# Patient Record
Sex: FEMALE | Race: AMERICAN INDIAN OR ALASKA NATIVE
[De-identification: names, ages, dates, MRNs, and addresses within clinical notes are randomized per-mention and may not be internally consistent; named-entity substitution may affect disease eponyms.]

---

## 2018-07-22 ENCOUNTER — HOSPITAL ENCOUNTER (EMERGENCY)
Dept: HOSPITAL 74 - JERFT | Age: 73
Discharge: HOME | End: 2018-07-22
Payer: COMMERCIAL

## 2018-07-22 VITALS — DIASTOLIC BLOOD PRESSURE: 88 MMHG | HEART RATE: 80 BPM | TEMPERATURE: 98.3 F | SYSTOLIC BLOOD PRESSURE: 160 MMHG

## 2018-07-22 VITALS — BODY MASS INDEX: 30.9 KG/M2

## 2018-07-22 DIAGNOSIS — Z79.84: ICD-10-CM

## 2018-07-22 DIAGNOSIS — M79.662: ICD-10-CM

## 2018-07-22 DIAGNOSIS — E11.9: ICD-10-CM

## 2018-07-22 DIAGNOSIS — M79.1: ICD-10-CM

## 2018-07-22 DIAGNOSIS — E78.00: ICD-10-CM

## 2018-07-22 DIAGNOSIS — J44.9: ICD-10-CM

## 2018-07-22 DIAGNOSIS — Z87.440: ICD-10-CM

## 2018-07-22 DIAGNOSIS — I10: ICD-10-CM

## 2018-07-22 DIAGNOSIS — D50.9: ICD-10-CM

## 2018-07-22 DIAGNOSIS — M79.661: Primary | ICD-10-CM

## 2018-07-22 NOTE — PDOC
History of Present Illness





- General


Chief Complaint: Pain


Stated Complaint: FATIGUE


Time Seen by Provider: 07/22/18 21:13


History Source: Patient


Exam Limitations: Clinical Condition





- History of Present Illness


Initial Comments: 





07/22/18 21:36


Patient with history of COPD present for evaluation of the lateral calf pain 

after starting Levaquin antibiotics with prednisone. Patient reported mild pain 

to cuff area with ambulation and presents for evaluation as she was told was 

possible  side effect of Levaquin. Denies any Achilles tenderness now denies 

any other symptoms now. Patient was being treated for bronchitis by PCP with 

Levaquin and prednisone and report her symptoms as improved with no cough now





Past History





- Past Medical History


Allergies/Adverse Reactions: 


 Allergies











Allergy/AdvReac Type Severity Reaction Status Date / Time


 


amoxicillin Allergy Severe diarrhea/severe Verified 07/22/18 20:56





   headache/  





   dizziness/freezing  











Home Medications: 


Ambulatory Orders





Atorvastatin Ca [Lipitor] 40 mg PO HS 06/28/13 


Cholecalciferol (Vitamin D3) [Vitamin D3] 1,000 unit PO DAILY 12/04/15 


Cyanocobalamin [Vitamin B12 -] 1,000 mcg PO DAILY 12/04/15 


Omega-3 Fatty Acids [Fish Oil] 300 mg PO DAILY 12/04/15 


Aspirin [ASA -] 81 mg PO DAILY 11/14/16 


Metoprolol Succinate [Toprol XL -] 12.5 mg PO DAILY 11/14/16 


metFORMIN HCL [Metformin HCl] 500 mg PO DAILY 11/14/16 


Hydrocodone/Acetaminophen [Hydrocodon-Acetaminoph 7.5-325] 1 each PO Q6H #40 

tablet MDD 4 11/15/16 








Anemia: Yes (TAKING IRON)


Asthma: No


Cancer: No


Cardiac Disorders: No


CVA: No


COPD: No


CHF: No


Dementia: No


Diabetes: Yes (NIDDM)


GI Disorders: No


 Disorders: Yes (FREQUENT UTI'S,KIDNEY STONES)


HTN: Yes


Hypercholesterolemia: Yes


Liver Disease: No


Seizures: No


Thyroid Disease: No





- Surgical History


Abdominal Surgery: No


Appendectomy: No


Cardiac Surgery: No


Cholecystectomy: No


Lung Surgery: No


Neurologic Surgery: No


Orthopedic Surgery: No





- Suicide/Smoking/Psychosocial Hx


Smoking Status: No


Smoking History: Former smoker


Have you smoked in the past 12 months: No


Number of Cigarettes Smoked Daily: 0


If you are a former smoker, when did you quit?: 2012


Information on smoking cessation initiated: No


Hx Alcohol Use: Yes


Drug/Substance Use Hx: No


Substance Use Type: Alcohol


Hx Substance Use Treatment: No





**Review of Systems





- Review of Systems


Able to Perform ROS?: Yes


Is the patient limited English proficient: No


Constitutional: No: Chills, Diaphoresis, Fever, Loss of Appetite, Malaise, 

Night Sweats, Weakness, Weight Stable, Unintentional Wgt. Loss, Unexplained wgt 

Loss, Other


HEENTM: No: Eye Pain, Blurred Vision, Tearing, Recent change in vision, Double 

Vision, Cataracts, Ear Pain, Ocular Prothesis, Ear Discharge, Nose Pain, Nose 

Congestion, Tinnitus, Nose Bleeding, Hearing Loss, Throat Pain, Throat Swelling

, Mouth Pain, Dental Problems, Difficulty Swallowing, Mouth Swelling, Other


Respiratory: No: Cough, Orthopnea, Shortness of Breath, SOB with Exertion, SOB 

at Rest, Stridor, Wheezing, Productive cough, Hemoptysis, Other


Cardiac (ROS): No: Chest Pain, Edema, Irregular Heart Rate, Lightheadedness, 

Palpitations, Syncope, Chest Tightness, Other


ABD/GI: No: Abdominal Distended, Abd. Pain w/ defecation, Blood Streaked Bowels

, Constipated, Diarrhea, Difficulty Swallowing, Nausea, Poor Appetite, Poor 

Fluid Intake, Rectal Bleeding, Vomiting, Indigestion, Abdominal cramping, Tarry 

Stools, Other


Musculoskeletal: Yes: Muscle Pain (b/l calf pain with ambulation)


All Other Systems: Reviewed and Negative





*Physical Exam





- Vital Signs


 Last Vital Signs











Temp Pulse Resp BP Pulse Ox


 


 98.3 F   80   18   160/88   100 


 


 07/22/18 20:53  07/22/18 20:53  07/22/18 20:53  07/22/18 20:53  07/22/18 20:53














- Physical Exam


Comments: 





07/22/18 21:38


GENERAL:


Well developed, well nourished. Awake and alert. No acute distress.


HEENT:


Normocephalic, atraumatic. PERRLA, EOMI. No conjunctival pallor. Sclera are non-

icteric. Moist mucous membranes. Oropharynx is clear.


NECK: 


Supple. Full ROM. No JVD. Carotid pulses 2+ and symmetric, without bruits. No 

thyromegaly. No lymphadenopathy.


CARDIOVASCULAR:


Regular rate and rhythm. No murmurs, rubs, or gallops. Distal pulses are 2+ and 

symmetric. 


PULMONARY: 


No evidence of respiratory distress. Lungs clear to auscultation bilaterally. 

No wheezing, rales or rhonchi.


ABDOMINAL:


Soft. Non-tender. Non-distended. No rebound or guarding. No organomegaly. 

Normoactive bowel sounds. 


MUSCULOSKELETAL 


Normal range of motion at all joints. No bony deformities or tenderness. No CVA 

tenderness.


EXTREMITIES: 


No cyanosis. No clubbing. No edema. No calf tenderness.


SKIN: 


Warm and dry. Normal capillary refill. No rashes. No jaundice. 


NEUROLOGICAL: 


Alert, awake, appropriate. Cranial nerves 2-12 intact. No deficits to light 

touch and temperature in face, upper extremities and lower extremities. No 

motor deficits in the in face, upper extremities and lower extremities. 

Normoreflexic in the upper and lower extremities. Normal speech. Toes are down-

going bilaterally. Gait is normal without ataxia.


PSYCHIATRIC: 


Cooperative. Good eye contact. Appropriate mood and affect.


General Appearance: Yes: Nourished, Appropriately Dressed.  No: Apparent 

Distress





Medical Decision Making





- Medical Decision Making





07/22/18 21:39


Patient with history of COPD presents for evaluation of car pain after starting 

Levaquin antibiotics with prednisone 4 days ago. No pain to b/l calf on exam 

now. No erythema to calf area. No swelling to calf area. No evidence of DVT on 

exam. Patient advised to stop Levaquin and prednisone and follow up with PCP. 

Patient advised to follow-up if worsening symptoms of calf pain or swelling to 

calf area





*DC/Admit/Observation/Transfer


Diagnosis at time of Disposition: 


 Bilateral calf pain








- Discharge Dispostion


Disposition: HOME


Condition at time of disposition: Good


Decision to Admit order: No





- Referrals


Referrals: 


Gabriel Wang MD [Primary Care Provider] - 





- Patient Instructions


Additional Instructions: 


Stop Levaquin antibiotics. Watch of follow swelling to calf area or at achilles 

tendon area. The temperature ER if worsening pain to calf area or Achilles 

tendon. Follow up with PCP tomorrow





- Post Discharge Activity

## 2019-06-03 ENCOUNTER — HOSPITAL ENCOUNTER (INPATIENT)
Dept: HOSPITAL 74 - JER | Age: 74
LOS: 8 days | DRG: 208 | End: 2019-06-11
Attending: FAMILY MEDICINE | Admitting: INTERNAL MEDICINE
Payer: COMMERCIAL

## 2019-06-03 VITALS — BODY MASS INDEX: 32.1 KG/M2

## 2019-06-03 DIAGNOSIS — R65.21: ICD-10-CM

## 2019-06-03 DIAGNOSIS — R68.0: ICD-10-CM

## 2019-06-03 DIAGNOSIS — E78.5: ICD-10-CM

## 2019-06-03 DIAGNOSIS — R44.3: ICD-10-CM

## 2019-06-03 DIAGNOSIS — I46.9: ICD-10-CM

## 2019-06-03 DIAGNOSIS — R41.82: ICD-10-CM

## 2019-06-03 DIAGNOSIS — I48.0: ICD-10-CM

## 2019-06-03 DIAGNOSIS — I10: ICD-10-CM

## 2019-06-03 DIAGNOSIS — J44.0: ICD-10-CM

## 2019-06-03 DIAGNOSIS — R74.8: ICD-10-CM

## 2019-06-03 DIAGNOSIS — I24.8: ICD-10-CM

## 2019-06-03 DIAGNOSIS — Z87.891: ICD-10-CM

## 2019-06-03 DIAGNOSIS — I95.9: ICD-10-CM

## 2019-06-03 DIAGNOSIS — E83.51: ICD-10-CM

## 2019-06-03 DIAGNOSIS — G93.1: ICD-10-CM

## 2019-06-03 DIAGNOSIS — J20.9: ICD-10-CM

## 2019-06-03 DIAGNOSIS — A41.89: ICD-10-CM

## 2019-06-03 DIAGNOSIS — I47.1: ICD-10-CM

## 2019-06-03 DIAGNOSIS — J44.1: Primary | ICD-10-CM

## 2019-06-03 DIAGNOSIS — K72.00: ICD-10-CM

## 2019-06-03 DIAGNOSIS — N17.9: ICD-10-CM

## 2019-06-03 DIAGNOSIS — J30.9: ICD-10-CM

## 2019-06-03 DIAGNOSIS — E66.9: ICD-10-CM

## 2019-06-03 DIAGNOSIS — G43.909: ICD-10-CM

## 2019-06-03 DIAGNOSIS — G20: ICD-10-CM

## 2019-06-03 DIAGNOSIS — E87.2: ICD-10-CM

## 2019-06-03 DIAGNOSIS — Z96.641: ICD-10-CM

## 2019-06-03 DIAGNOSIS — Z88.0: ICD-10-CM

## 2019-06-03 DIAGNOSIS — E87.6: ICD-10-CM

## 2019-06-03 DIAGNOSIS — R73.03: ICD-10-CM

## 2019-06-03 DIAGNOSIS — G25.81: ICD-10-CM

## 2019-06-03 DIAGNOSIS — J96.01: ICD-10-CM

## 2019-06-03 DIAGNOSIS — R91.1: ICD-10-CM

## 2019-06-03 DIAGNOSIS — E87.5: ICD-10-CM

## 2019-06-03 DIAGNOSIS — G93.41: ICD-10-CM

## 2019-06-03 LAB
ALBUMIN SERPL-MCNC: 3.2 G/DL (ref 3.4–5)
ALP SERPL-CCNC: 83 U/L (ref 45–117)
ALT SERPL-CCNC: 24 U/L (ref 13–61)
ANION GAP SERPL CALC-SCNC: 11 MMOL/L (ref 8–16)
AST SERPL-CCNC: 62 U/L (ref 15–37)
BASOPHILS # BLD: 0.2 % (ref 0–2)
BILIRUB SERPL-MCNC: 0.8 MG/DL (ref 0.2–1)
BNP SERPL-MCNC: 1344.6 PG/ML (ref 5–125)
BUN SERPL-MCNC: 28 MG/DL (ref 7–18)
CALCIUM SERPL-MCNC: 9.4 MG/DL (ref 8.5–10.1)
CHLORIDE SERPL-SCNC: 99 MMOL/L (ref 98–107)
CO2 SERPL-SCNC: 25 MMOL/L (ref 21–32)
CREAT SERPL-MCNC: 1.7 MG/DL (ref 0.55–1.3)
DEPRECATED RDW RBC AUTO: 13.3 % (ref 11.6–15.6)
EOSINOPHIL # BLD: 0.1 % (ref 0–4.5)
GLUCOSE SERPL-MCNC: 153 MG/DL (ref 74–106)
HCT VFR BLD CALC: 41.9 % (ref 32.4–45.2)
HGB BLD-MCNC: 13.9 GM/DL (ref 10.7–15.3)
LYMPHOCYTES # BLD: 7.9 % (ref 8–40)
MCH RBC QN AUTO: 32.2 PG (ref 25.7–33.7)
MCHC RBC AUTO-ENTMCNC: 33.1 G/DL (ref 32–36)
MCV RBC: 97.3 FL (ref 80–96)
MONOCYTES # BLD AUTO: 10 % (ref 3.8–10.2)
NEUTROPHILS # BLD: 81.8 % (ref 42.8–82.8)
PLATELET # BLD AUTO: 254 K/MM3 (ref 134–434)
PMV BLD: 7.5 FL (ref 7.5–11.1)
POTASSIUM SERPLBLD-SCNC: 5.7 MMOL/L (ref 3.5–5.1)
PROT SERPL-MCNC: 7.3 G/DL (ref 6.4–8.2)
RBC # BLD AUTO: 4.31 M/MM3 (ref 3.6–5.2)
SODIUM SERPL-SCNC: 135 MMOL/L (ref 136–145)
WBC # BLD AUTO: 14.9 K/MM3 (ref 4–10)

## 2019-06-03 PROCEDURE — G0480 DRUG TEST DEF 1-7 CLASSES: HCPCS

## 2019-06-03 RX ADMIN — ALBUTEROL SULFATE SCH AMP: 2.5 SOLUTION RESPIRATORY (INHALATION) at 23:01

## 2019-06-03 RX ADMIN — METHYLPREDNISOLONE SODIUM SUCCINATE SCH MG: 40 INJECTION, POWDER, FOR SOLUTION INTRAMUSCULAR; INTRAVENOUS at 20:44

## 2019-06-03 RX ADMIN — IPRATROPIUM BROMIDE AND ALBUTEROL SULFATE SCH AMP: .5; 3 SOLUTION RESPIRATORY (INHALATION) at 16:00

## 2019-06-03 RX ADMIN — IPRATROPIUM BROMIDE AND ALBUTEROL SULFATE SCH AMP: .5; 3 SOLUTION RESPIRATORY (INHALATION) at 16:15

## 2019-06-03 RX ADMIN — IPRATROPIUM BROMIDE AND ALBUTEROL SULFATE SCH AMP: .5; 3 SOLUTION RESPIRATORY (INHALATION) at 15:45

## 2019-06-03 RX ADMIN — ATORVASTATIN CALCIUM SCH MG: 40 TABLET, FILM COATED ORAL at 23:37

## 2019-06-03 RX ADMIN — HEPARIN SODIUM SCH UNIT: 5000 INJECTION, SOLUTION INTRAVENOUS; SUBCUTANEOUS at 23:38

## 2019-06-03 RX ADMIN — SODIUM CHLORIDE SCH MLS/HR: 9 INJECTION, SOLUTION INTRAVENOUS at 23:37

## 2019-06-03 RX ADMIN — ALBUTEROL SULFATE SCH AMP: 2.5 SOLUTION RESPIRATORY (INHALATION) at 20:44

## 2019-06-03 RX ADMIN — INSULIN ASPART SCH: 100 INJECTION, SOLUTION INTRAVENOUS; SUBCUTANEOUS at 23:38

## 2019-06-03 RX ADMIN — IPRATROPIUM BROMIDE AND ALBUTEROL SULFATE SCH AMP: .5; 3 SOLUTION RESPIRATORY (INHALATION) at 16:28

## 2019-06-03 NOTE — PDOC
Documentation entered by Karmen Iniguez SCRIBE, acting as scribe for Allison Herrera MD.








Allison Herrera MD:  This documentation has been prepared by the Geetha verdugo Sammi, SCRIBE, under my direction and personally reviewed by me in 

its entirety.  I confirm that the documentation accurately reflects all work, 

treatment, procedures, and medical decision making performed by me.  





Attending Attestation





- Resident


Resident Name: RoshniElie





- ED Attending Attestation


I have performed the following: I have examined & evaluated the patient, The 

case was reviewed & discussed with the resident, I agree w/resident's findings 

& plan, Exceptions are as noted





- HPI


HPI: 





06/03/19 18:12


The patient is a 74 year old female, with a significant PMH of asthma, COPD, 

arthritis, who presents to the emergency department today for evaluation of 4 

days of worsening productive cough (producing yellow phlegm), SOB, and chest 

pain. The patient states her symptoms were so severe over the weekend and notes 

hallucinations. The patient notes trouble sleeping and the need to elevated her 

head, secondary to SOB. She states she was on her way to her cardiologist when 

she was having trouble walking, prompting her arrival to the ED. She reports 

seeing her PCP on Friday, where she was prescribed Zyrtec with little relief. 





Denies fever or chills. 





Allergies: amoxicillin


Social history: Former smoker (quit 2013)


PCP: Felipe











- Physicial Exam


PE: 





06/03/19 18:14


GENERAL: 


Well-appearing, well-nourished. No apparent distress.


HEENT: 


Normocephalic, atraumatic. PERRL, EOM intact.


CARDIOVASCULAR: 


Normal S1, S2. Regular rate and rhythm.


PULMONARY: 


(+) Rhonchi 


ABDOMEN: 


Soft, non-distended, non-tender. 


EXTREMITIES: 


Normal ROM in all four extremities. No gross deformities.


SKIN: 


Warm, dry.  No rash


NEUROLOGICAL: 


No focal neurological deficits.








- Medical Decision Making





06/03/19 19:27


pt is hypoxic and needs o2 supplementation, wheezing and requires 

brobchodilators


cxr  no infiltrates


imp copd exacerbation


will admit to med/surg

## 2019-06-03 NOTE — HP
Admitting History and Physical





- Primary Care Physician


PCP: Gabriel Wang





- Admission


Chief Complaint: shortness of breath


History of Present Illness: 





74 year old female with a PMHx COPD, OA, PreDM2, HTN, HLD presents with wheezing

, progressively worsening KUHN, and productive cough since Friday. She went to 

her PCP Friday and was given Zyrtec with no relief. Over the weekend she felt 

fatigue, runny nose and yellow sputum productive cough. Today she had 

significant KUHN walking to the car so she came to ED for further evaluation. 

She denies fever/chills, n/v/d. Has some chest pressure when she is forcefully 

coughing. No recent travel or sick contacts.





When asked about her allergy to PCN, she states she is not allergic to PCN, was 

an error.


History Source: Patient


Limitations to Obtaining History: No Limitations





- Past Medical History


Cardiovascular: Yes: HTN, Hyperlipdemia


Pulmonary: Yes: COPD


Musculoskeletal: Yes: Osteoarthritis


ENT: Yes: Allergic Rhinitis


Endocrine: Yes: Diabetes Mellitus (Prediabetes)





- Past Surgical History


Past Surgical History: Yes: Joint Replacement (right hip replacement, left arm 

surgery)





- Smoking History


Smoking history: Former smoker (smoked over 1 ppd for over 40 years, quit 6 

years ago)


Have you smoked in the past 12 months: No


Aproximately how many cigarettes per day: 0


If you are a former smoker, when did you quit?: 2012





- Alcohol/Substance Use


Hx Alcohol Use: No


History of Substance Use: reports: None





- Social History


ADL: Independent


History of Recent Travel: No





Home Medications





- Allergies


Allergies/Adverse Reactions: 


 Allergies











Allergy/AdvReac Type Severity Reaction Status Date / Time


 


No Known Drug Allergies Allergy   Verified 06/03/19 20:56














- Home Medications


Home Medications: 


Ambulatory Orders





Atorvastatin Ca [Lipitor] 40 mg PO HS 06/28/13 


Cholecalciferol (Vitamin D3) [Vitamin D3] 1,000 unit PO DAILY 12/04/15 


Cyanocobalamin [Vitamin B12 -] 1,000 mcg PO DAILY 12/04/15 


Omega-3 Fatty Acids [Fish Oil] 300 mg PO DAILY 12/04/15 


Aspirin [ASA -] 81 mg PO DAILY 11/14/16 


Metoprolol Succinate [Toprol XL -] 12.5 mg PO DAILY 11/14/16 











Family Disease History





- Family Disease History


Family Disease History: Other: Father (htn), Mother (htn)





Review of Systems





- Review of Systems


Constitutional: reports: Lethargy


Eyes: reports: No Symptoms


HENT: reports: Nasal Congestion


Neck: reports: No Symptoms


Cardiovascular: reports: Shortness of Breath


Respiratory: reports: Cough, SOB on Exertion, Wheezing


Gastrointestinal: reports: No Symptoms


Genitourinary: reports: No Symptoms


Breasts: reports: No Symptoms Reported


Musculoskeletal: reports: No Symptoms


Integumentary: reports: No Symptoms


Neurological: reports: No Symptoms


Endocrine: reports: No Symptoms


Hematology/Lymphatic: reports: No Symptoms


Psychiatric: reports: No Symptoms





Physical Examination


Vital Signs: 


 Vital Signs











Temperature  98.3 F   06/03/19 15:21


 


Pulse Rate  113 H  06/03/19 20:02


 


Respiratory Rate  16   06/03/19 19:58


 


Blood Pressure  110/60   06/03/19 20:02


 


O2 Sat by Pulse Oximetry (%)  99   06/03/19 20:02











Constitutional: Yes: Well Nourished, No Distress, Calm


Eyes: Yes: WNL, Conjunctiva Clear, EOM Intact


HENT: Yes: WNL, Atraumatic, Normocephalic


Neck: Yes: WNL, Supple, Trachea Midline


Cardiovascular: Yes: Tachycardia, S1, S2


Respiratory: Yes: Cough, Diminished (poor air entry at bilateral bases), On 

Nasal O2, SOB on Exertion, Wheezes (bilateral lung fields).  No: Accessory 

Muscle Use


Gastrointestinal: Yes: WNL, Normal Bowel Sounds, Soft, Abdomen, Obese


Musculoskeletal: Yes: WNL


Extremities: Yes: WNL


Edema: No


Peripheral Pulses WNL: Yes


...Motor Strength: WNL


Psychiatric: Yes: WNL


Labs: 


 CBC, BMP





 06/03/19 17:50 





 06/03/19 16:28 











Imaging





- Results


Chest X-ray: Report Reviewed, Image Reviewed


EKG: Report Reviewed, Image Reviewed





Problem List





- Problems


(1) COPD exacerbation


Code(s): J44.1 - CHRONIC OBSTRUCTIVE PULMONARY DISEASE W (ACUTE) EXACERBATION   





(2) HTN (hypertension)


Code(s): I10 - ESSENTIAL (PRIMARY) HYPERTENSION   





(3) HLD (hyperlipidemia)


Code(s): E78.5 - HYPERLIPIDEMIA, UNSPECIFIED   





(4) Prediabetes


Code(s): R73.03 - PREDIABETES   





(5) THOMPSON (acute kidney injury)


Code(s): N17.9 - ACUTE KIDNEY FAILURE, UNSPECIFIED   





Assessment/Plan





74 year old female 





1) Acute hypoxic respiratory failure secondary to COPD exacerbation


-start IV steroids


-inhaled bronchodilators


-levaquin


-mucolytics


-supplemental 02 as needed, keep sats > 92%


-BNP elevated, CXR no evidence of fluid overload, clinically not in heart 

failure





2) THOMPSON 


-gentle IV fluids


-u/a pending


-reassess in AM





3) HTN


-resume bb





4) HLD


-resume statin





5) PreDM-diet controlled


-diabetic diet and ISS while on steroids


-BS checks ACHS





6) OA, controlled

## 2019-06-03 NOTE — PDOC
Rapid Medical Evaluation


Chief Complaint: Shortness of Breath


Time Seen by Provider: 06/03/19 15:20


Medical Evaluation: 


 Allergies











Allergy/AdvReac Type Severity Reaction Status Date / Time


 


amoxicillin Allergy Severe diarrhea/severe Verified 07/22/18 20:56





   headache/  





   dizziness/freezing  











06/03/19 15:20


I have done a brief in-person evaluation of this patient 


cc:


diagnosed with uri


seen by pmd, given zyrtec


reports getting worse; productive coughing with greenish phlegm, congestion and 

shortness of breath





PE: 


NAD


lungs: diminished lungs sounds


heart S1s2





Orders: 


neb treatment 


chest xray 





this patient will proceed to ed for further evaluation 





**Discharge Disposition





- Diagnosis


 Cough








- Referrals





- Patient Instructions





- Post Discharge Activity

## 2019-06-03 NOTE — PDOC
History of Present Illness





<JavierAllisonheather Spaulding - Last Filed: 06/03/19 19:27>





- History of Present Illness


Initial Comments: 





06/03/19 17:18


74F with pmh of asthma/COPD and arthritis present sot the Ed for symptoms of 

coughing, sob and post-tussive chest pain worsening since Thursday. 


Saw her PCP on Friday who prescribed Zyrtec for allergies but patient is now 

coughing yellow flegm. 


Chest pain is substernal, nonradiating. 


Denies subhective fever. 


Daughter feels like her mother looks more weak in the way she ambulates. 


Used to smoke more than a pack a day for decades. Stopped smoking in [id number]. 


Needs to sleep with head reclined, unable to sleep flat due to difficulty 

breathing. 


Denies headache, abdominal pain, dysuria, nausea, vomiting diarrhea. 





Took tylenol 600mg yesterday and the day before. 


Took her symbicort as prescribed every day. 





<Elie Barakat - Last Filed: 06/03/19 19:30>





- General


Chief Complaint: Shortness of Breath


Stated Complaint: RESPIRATORY INFECTION


Time Seen by Provider: 06/03/19 15:20





Past History





<JavierAllisonheather Spaulding - Last Filed: 06/03/19 19:27>





- Past Medical History


Anemia: Yes (TAKING IRON)


Asthma: No


Cancer: No


Cardiac Disorders: No


CVA: No


COPD: Yes


CHF: No


Dementia: No


Diabetes: Yes (NIDDM)


GI Disorders: No


 Disorders: Yes (FREQUENT UTI'S,KIDNEY STONES)


HTN: Yes


Hypercholesterolemia: Yes


Liver Disease: No


Seizures: No


Thyroid Disease: No





- Surgical History


Abdominal Surgery: No


Appendectomy: No


Cardiac Surgery: No


Cholecystectomy: No


Lung Surgery: No


Neurologic Surgery: No


Orthopedic Surgery: No





- Immunization History


Immunization Up to Date: No





- Suicide/Smoking/Psychosocial Hx


Smoking Status: No


Smoking History: Never smoked


Have you smoked in the past 12 months: No


Number of Cigarettes Smoked Daily: 0


If you are a former smoker, when did you quit?: 2012


Information on smoking cessation initiated: No


Hx Alcohol Use: No


Drug/Substance Use Hx: No


Substance Use Type: Alcohol


Hx Substance Use Treatment: No





<Elie Barakat - Last Filed: 06/03/19 19:30>





- Past Medical History


Allergies/Adverse Reactions: 


 Allergies











Allergy/AdvReac Type Severity Reaction Status Date / Time


 


amoxicillin Allergy Severe diarrhea/severe Verified 07/22/18 20:56





   headache/  





   dizziness/freezing  











Home Medications: 


Ambulatory Orders





Atorvastatin Ca [Lipitor] 40 mg PO HS 06/28/13 


Cholecalciferol (Vitamin D3) [Vitamin D3] 1,000 unit PO DAILY 12/04/15 


Cyanocobalamin [Vitamin B12 -] 1,000 mcg PO DAILY 12/04/15 


Omega-3 Fatty Acids [Fish Oil] 300 mg PO DAILY 12/04/15 


Aspirin [ASA -] 81 mg PO DAILY 11/14/16 


Metoprolol Succinate [Toprol XL -] 12.5 mg PO DAILY 11/14/16 


metFORMIN HCL [Metformin HCl] 500 mg PO DAILY 11/14/16 


Hydrocodone/Acetaminophen [Hydrocodon-Acetaminoph 7.5-325] 1 each PO Q6H #40 

tablet MDD 4 11/15/16 











**Review of Systems





- Review of Systems


Able to Perform ROS?: Yes


Is the patient limited English proficient: No


Constitutional: No: Chills, Diaphoresis, Fever


HEENTM: No: Symptoms Reported


Respiratory: Yes: See HPI, Cough, Orthopnea, Shortness of Breath, Wheezing, 

Productive cough


Cardiac (ROS): Yes: See HPI, Chest Pain


ABD/GI: No: Symptoms Reported


: No: Symptoms Reported


Musculoskeletal: No: Symptoms Reported


Integumentary: No: Symptoms Reported


Neurological: No: Symptoms reported


All Other Systems: Reviewed and Negative





<Elie Barakat - Last Filed: 06/03/19 19:30>





*Physical Exam





- Vital Signs


 Last Vital Signs











Temp Pulse Resp BP Pulse Ox


 


 98.3 F   107 H  16   111/65   89 L


 


 06/03/19 15:21  06/03/19 15:21  06/03/19 15:21  06/03/19 15:21  06/03/19 15:21














<Allison Herrera - Last Filed: 06/03/19 19:27>





- Vital Signs


 Last Vital Signs











Temp Pulse Resp BP Pulse Ox


 


 98.3 F   107 H  16   111/65   89 L


 


 06/03/19 15:21  06/03/19 15:21  06/03/19 15:21  06/03/19 15:21  06/03/19 15:21














- Physical Exam


General Appearance: Yes: Obese


HEENT: positive: EOMI, DA, Normal ENT Inspection, Other (left ear canal 

clogged with wax)


Respiratory/Chest: positive: Chest Tender (mildly over sternum), Decreased 

Breath Sounds, Wheezing (in all quadrants, insp/exp. ).  negative: Respiratory 

Distress


Cardiovascular: positive: Regular Rhythm, Regular Rate, S1, S2


Gastrointestinal/Abdominal: positive: Normal Bowel Sounds, Soft.  negative: 

Tender


Musculoskeletal: positive: Normal Inspection.  negative: CVA Tenderness


Extremity: positive: Normal Capillary Refill, Normal Inspection, Normal Range 

of Motion


Integumentary: positive: Normal Color, Dry, Warm


Neurologic: positive: Fully Oriented, Alert, Normal Mood/Affect, Normal Response

, Motor Strength 5/5





<Elie Barakat - Last Filed: 06/03/19 19:30>





ED Treatment Course





- LABORATORY


CBC & Chemistry Diagram: 


 06/03/19 17:50





 06/03/19 16:28





- ADDITIONAL ORDERS


Additional order review: 


 Laboratory  Results











  06/03/19





  16:28


 


Sodium  135 L


 


Potassium  5.7 H


 


Chloride  99


 


Carbon Dioxide  25


 


Anion Gap  11


 


BUN  28 H


 


Creatinine  1.7 H


 


Est GFR (CKD-EPI)AfAm  34.08


 


Est GFR (CKD-EPI)NonAf  29.40


 


Random Glucose  153 H


 


Calcium  9.4


 


Total Bilirubin  0.8


 


AST  62 H


 


ALT  24


 


Alkaline Phosphatase  83


 


Troponin I  < 0.02


 


B-Natriuretic Peptide  1344.6 H


 


Total Protein  7.3


 


Albumin  3.2 L








 











  06/03/19





  17:50


 


RBC  4.31


 


MCV  97.3 H


 


MCHC  33.1


 


RDW  13.3


 


MPV  7.5


 


Neutrophils %  81.8  D


 


Lymphocytes %  7.9 L D


 


Monocytes %  10.0


 


Eosinophils %  0.1  D


 


Basophils %  0.2














- Medications


Given in the ED: 


ED Medications














Discontinued Medications














Generic Name Dose Route Start Last Admin





  Trade Name Freq  PRN Reason Stop Dose Admin


 


Albuterol/Ipratropium  1 amp  06/03/19 15:30  06/03/19 16:28





  Duoneb -  NEB  06/03/19 16:16  1 amp





  Q15M DORIE   Administration





     





     





     





     


 


Prednisone  60 mg  06/03/19 18:00  06/03/19 18:10





  Deltasone -  PO  06/03/19 18:01  60 mg





  ONCE ONE   Administration





     





     





     





     














<Allison Herrera - Last Filed: 06/03/19 19:27>





- LABORATORY


CBC & Chemistry Diagram: 


 06/03/19 17:50





 06/03/19 16:28





<Elie Barakat - Last Filed: 06/03/19 19:30>





Medical Decision Making





- Medical Decision Making





06/03/19 17:26


74f with pmh of asthma copd htn presenting with productive cough, sob, chest 

pain and diffuse wheezing for the past 4 days. 





Asthma/COPd exacerbation vs Respiratory infection . +/- ACS? vs dissection?, ---

>EKG and trops. 


Will obtain  xray to look for pneumonia. Reassess vital 


Due to the patient not having fever and having diffuse wheezing it is more 

likely that she has a asthma exacerbation. 


PAtient getting 4 duonebs from [facility]. 


Will get rest of labs, steroids? reassess. 


06/03/19 17:53





06/03/19 18:36


EKG: Sinus tachycardia with PAC's. Possible left atrial enlargement. 


06/03/19 18:50


Chest xray: A single AP view of the chest is been submitted. Since 11/24/2015, 

there is slight increase in


symmetrical density at the bases most likely related to breast soft tissues. 

Mediastinum is


not widened. No sign of gross consolidation, failure or pneumothorax. The bones 

and soft


tissues are intact. Correlation recommended.


06/03/19 18:51


Patient still hypoxic unless getting 8L of O2


06/03/19 19:29


Patient admitted to hospitalist med surg for persistent hypoxia secondary to  

asthma exacerbation





<Elie Barakat - Last Filed: 06/03/19 19:30>





*DC/Admit/Observation/Transfer





<Allison Herrera - Last Filed: 06/03/19 19:27>





- Discharge Dispostion


Decision to Admit order: Yes





<Elie Barakat - Last Filed: 06/03/19 19:30>


Diagnosis at time of Disposition: 


 Cough








- Discharge Dispostion


Condition at time of disposition: Stable





- Referrals


Referrals: 


Gabriel Wang MD [Primary Care Provider] -

## 2019-06-04 LAB
ANION GAP SERPL CALC-SCNC: 9 MMOL/L (ref 8–16)
ANISOCYTOSIS BLD QL: 0
APPEARANCE UR: (no result)
APPEARANCE UR: CLEAR
BACTERIA #/AREA URNS HPF: 1.5 /HPF
BACTERIA #/AREA URNS HPF: 3.7 /HPF
BASOPHILS # BLD: 0.1 % (ref 0–2)
BILIRUB UR STRIP.AUTO-MCNC: NEGATIVE MG/DL
BILIRUB UR STRIP.AUTO-MCNC: NEGATIVE MG/DL
BUN SERPL-MCNC: 39 MG/DL (ref 7–18)
CALCIUM SERPL-MCNC: 8.8 MG/DL (ref 8.5–10.1)
CASTS #/AREA URNS LPF: 26 /LPF (ref 0–8)
CASTS #/AREA URNS LPF: 47 /LPF (ref 0–8)
CHLORIDE SERPL-SCNC: 99 MMOL/L (ref 98–107)
CO2 SERPL-SCNC: 28 MMOL/L (ref 21–32)
COLOR UR: YELLOW
COLOR UR: YELLOW
CREAT SERPL-MCNC: 1.6 MG/DL (ref 0.55–1.3)
DEPRECATED RDW RBC AUTO: 13.2 % (ref 11.6–15.6)
EOSINOPHIL # BLD: 0 % (ref 0–4.5)
EPITH CASTS URNS QL MICRO: 3.5 /HPF
EPITH CASTS URNS QL MICRO: 6 /HPF
GLUCOSE SERPL-MCNC: 190 MG/DL (ref 74–106)
HCT VFR BLD CALC: 37.1 % (ref 32.4–45.2)
HGB BLD-MCNC: 12.7 GM/DL (ref 10.7–15.3)
KETONES UR QL STRIP: NEGATIVE
KETONES UR QL STRIP: NEGATIVE
LEUKOCYTE ESTERASE UR QL STRIP.AUTO: NEGATIVE
LEUKOCYTE ESTERASE UR QL STRIP.AUTO: NEGATIVE
LYMPHOCYTES # BLD: 3.6 % (ref 8–40)
MACROCYTES BLD QL: 0
MCH RBC QN AUTO: 32.8 PG (ref 25.7–33.7)
MCHC RBC AUTO-ENTMCNC: 34.2 G/DL (ref 32–36)
MCV RBC: 95.7 FL (ref 80–96)
MONOCYTES # BLD AUTO: 2.4 % (ref 3.8–10.2)
NEUTROPHILS # BLD: 93.9 % (ref 42.8–82.8)
NITRITE UR QL STRIP: NEGATIVE
NITRITE UR QL STRIP: NEGATIVE
PH UR: 5 [PH] (ref 5–8)
PH UR: 5 [PH] (ref 5–8)
PLATELET # BLD AUTO: 240 K/MM3 (ref 134–434)
PLATELET BLD QL SMEAR: NORMAL
PMV BLD: 7 FL (ref 7.5–11.1)
POTASSIUM SERPLBLD-SCNC: 4.1 MMOL/L (ref 3.5–5.1)
PROT UR QL STRIP: (no result)
RBC # BLD AUTO: 3.87 M/MM3 (ref 3.6–5.2)
RBC # BLD AUTO: 4 /HPF (ref 0–4)
RBC # BLD AUTO: 5 /HPF (ref 0–4)
SODIUM SERPL-SCNC: 136 MMOL/L (ref 136–145)
SP GR UR: 1.02 (ref 1.01–1.03)
SP GR UR: 1.02 (ref 1.01–1.03)
UROBILINOGEN UR STRIP-MCNC: 0.2 MG/DL (ref 0.2–1)
UROBILINOGEN UR STRIP-MCNC: 0.2 MG/DL (ref 0.2–1)
WBC # BLD AUTO: 10.1 K/MM3 (ref 4–10)
WBC # UR AUTO: 4 /HPF (ref 0–5)
WBC # UR AUTO: 5 /HPF (ref 0–5)

## 2019-06-04 RX ADMIN — IPRATROPIUM BROMIDE AND ALBUTEROL SULFATE SCH AMP: .5; 3 SOLUTION RESPIRATORY (INHALATION) at 20:09

## 2019-06-04 RX ADMIN — IPRATROPIUM BROMIDE AND ALBUTEROL SULFATE SCH AMP: .5; 3 SOLUTION RESPIRATORY (INHALATION) at 15:40

## 2019-06-04 RX ADMIN — SODIUM CHLORIDE SCH MLS/HR: 9 INJECTION, SOLUTION INTRAVENOUS at 21:45

## 2019-06-04 RX ADMIN — ALBUTEROL SULFATE SCH: 2.5 SOLUTION RESPIRATORY (INHALATION) at 11:20

## 2019-06-04 RX ADMIN — AZITHROMYCIN DIHYDRATE SCH MLS/HR: 500 INJECTION, POWDER, LYOPHILIZED, FOR SOLUTION INTRAVENOUS at 12:16

## 2019-06-04 RX ADMIN — HEPARIN SODIUM SCH UNIT: 5000 INJECTION, SOLUTION INTRAVENOUS; SUBCUTANEOUS at 21:46

## 2019-06-04 RX ADMIN — IPRATROPIUM BROMIDE AND ALBUTEROL SULFATE SCH AMP: .5; 3 SOLUTION RESPIRATORY (INHALATION) at 11:10

## 2019-06-04 RX ADMIN — SODIUM CHLORIDE SCH MLS/HR: 9 INJECTION, SOLUTION INTRAVENOUS at 16:16

## 2019-06-04 RX ADMIN — BENZOCAINE AND MENTHOL PRN EACH: 15; 3.6 LOZENGE ORAL at 20:24

## 2019-06-04 RX ADMIN — INSULIN ASPART SCH UNITS: 100 INJECTION, SOLUTION INTRAVENOUS; SUBCUTANEOUS at 06:38

## 2019-06-04 RX ADMIN — INSULIN ASPART SCH UNITS: 100 INJECTION, SOLUTION INTRAVENOUS; SUBCUTANEOUS at 16:15

## 2019-06-04 RX ADMIN — ASPIRIN 81 MG SCH MG: 81 TABLET ORAL at 09:05

## 2019-06-04 RX ADMIN — HEPARIN SODIUM SCH UNIT: 5000 INJECTION, SOLUTION INTRAVENOUS; SUBCUTANEOUS at 09:06

## 2019-06-04 RX ADMIN — METHYLPREDNISOLONE SODIUM SUCCINATE SCH MG: 40 INJECTION, POWDER, FOR SOLUTION INTRAMUSCULAR; INTRAVENOUS at 02:41

## 2019-06-04 RX ADMIN — METHYLPREDNISOLONE SODIUM SUCCINATE SCH MG: 40 INJECTION, POWDER, FOR SOLUTION INTRAMUSCULAR; INTRAVENOUS at 09:07

## 2019-06-04 RX ADMIN — METHYLPREDNISOLONE SODIUM SUCCINATE SCH MG: 40 INJECTION, POWDER, FOR SOLUTION INTRAMUSCULAR; INTRAVENOUS at 17:39

## 2019-06-04 RX ADMIN — ALBUTEROL SULFATE SCH AMP: 2.5 SOLUTION RESPIRATORY (INHALATION) at 07:20

## 2019-06-04 RX ADMIN — GUAIFENESIN AND DEXTROMETHORPHAN HYDROBROMIDE PRN ML: 100; 10 SOLUTION ORAL at 00:14

## 2019-06-04 RX ADMIN — ATORVASTATIN CALCIUM SCH MG: 40 TABLET, FILM COATED ORAL at 21:46

## 2019-06-04 RX ADMIN — CEFTRIAXONE SCH MLS/HR: 1 INJECTION, POWDER, FOR SOLUTION INTRAMUSCULAR; INTRAVENOUS at 09:07

## 2019-06-04 RX ADMIN — INSULIN ASPART SCH: 100 INJECTION, SOLUTION INTRAVENOUS; SUBCUTANEOUS at 21:46

## 2019-06-04 RX ADMIN — INSULIN ASPART SCH: 100 INJECTION, SOLUTION INTRAVENOUS; SUBCUTANEOUS at 12:16

## 2019-06-04 RX ADMIN — IPRATROPIUM BROMIDE AND ALBUTEROL SULFATE SCH: .5; 3 SOLUTION RESPIRATORY (INHALATION) at 07:20

## 2019-06-04 RX ADMIN — ALBUTEROL SULFATE SCH AMP: 2.5 SOLUTION RESPIRATORY (INHALATION) at 04:15

## 2019-06-04 RX ADMIN — GUAIFENESIN AND DEXTROMETHORPHAN HYDROBROMIDE PRN ML: 100; 10 SOLUTION ORAL at 06:38

## 2019-06-04 NOTE — CON.PULM
Consult


Consult Specialty:: PULMONARY


Referred by:: Dr Wang


Reason for Consultation:: shortness of breath





- History of Present Illness


Chief Complaint: shortness of breath


History of Present Illness: 





73yo female with h/o HTN, hyperlipidemia, COPD who was admitted with worsening 

shortness of breath and cough x 3 days. Chest pain with coughing. No fevers, 

chills or sweats but was feeling generalized weakness. +cough productive of 

yellow sputum and wheezing. She is maintained at home on Symbicort. She stopped 

smoking 6 years ago, smoked about 1 PPD for 40 years before that.








- History Source


History Provided By: Patient, Medical Record


Limitations to Obtaining History: No Limitations





- Past Medical History


Cardio/Vascular: Yes: HTN, Hyperlipdemia


Pulmonary: Yes: COPD


Musculoskeletal: Yes: Osteoarthritis


ENT: Yes: Allergic Rhinitis


Endocrine: Yes: Diabetes Mellitus (Prediabetes)





- Past Surgical History


Past Surgical History: Yes: Joint Replacement (right hip replacement, left arm 

surgery)





- Alcohol/Substance Use


Hx Alcohol Use: No


History of Substance Use: reports: None





- Smoking History


Smoking history: Former smoker (smoked over 1 ppd for over 40 years, quit 6 

years ago)


Have you smoked in the past 12 months: No


Aproximately how many cigarettes per day: 0


If you are a former smoker, when did you quit?: 2012





- Social History


ADL: Independent


History of Recent Travel: No





Home Medications





- Allergies


Allergies/Adverse Reactions: 


 Allergies











Allergy/AdvReac Type Severity Reaction Status Date / Time


 


No Known Drug Allergies Allergy   Verified 06/03/19 20:56














- Home Medications


Home Medications: 


Ambulatory Orders





Atorvastatin Ca [Lipitor] 40 mg PO HS 06/28/13 


Cholecalciferol (Vitamin D3) [Vitamin D3] 1,000 unit PO DAILY 12/04/15 


Cyanocobalamin [Vitamin B12 -] 1,000 mcg PO DAILY 12/04/15 


Omega-3 Fatty Acids [Fish Oil] 300 mg PO DAILY 12/04/15 


Aspirin [ASA -] 81 mg PO DAILY 11/14/16 


Metoprolol Succinate [Toprol XL -] 12.5 mg PO DAILY 11/14/16 











Family Disease History





- Family Disease History


Family Disease History: Other: Father (htn), Mother (htn)





Review of Systems





- Review of Systems


Constitutional: reports: Malaise, Weakness.  denies: Chills, Lethargy


Eyes: denies: Recent Change in Vision


HENT: denies: Nasal Congestion, Throat Pain


Neck: denies: Stiffness, Tenderness


Cardiovascular: reports: Chest Pain, Shortness of Breath.  denies: Edema, 

Palpitations


Respiratory: reports: Cough, Exercise Intolerance, SOB, Wheezing.  denies: 

Hemoptysis


Gastrointestinal: denies: Abdominal Pain, Nausea, Vomiting


Genitourinary: denies: Dysuria, Hematuria


Neurological: denies: Dizziness, Headache


Endocrine: denies: Unexplained Weight Loss





Physical Exam


Vital Sings: 


 Vital Signs











Temperature  97.8 F   06/04/19 06:44


 


Pulse Rate  114 H  06/04/19 06:44


 


Respiratory Rate  24 H  06/04/19 06:44


 


Blood Pressure  128/79   06/04/19 06:44


 


O2 Sat by Pulse Oximetry (%)  99   06/03/19 21:00











Constitutional: Yes: Mild Distress


Eyes: Yes: Conjunctiva Clear, EOM Intact


HENT: Yes: Atraumatic, Normocephalic


Neck: Yes: Supple, Trachea Midline


Cardiovascular: Yes: Regular Rate and Rhythm


Respiratory: Yes: Rhonchi, Wheezes


...Clubbing: No


Gastrointestinal: Yes: Normal Bowel Sounds, Soft.  No: Tenderness


Edema: No


Labs: 


 CBC, BMP





 06/04/19 06:15 





 06/04/19 06:15 











Imaging





- Results


Chest X-ray: Report Reviewed, Image Reviewed


Cat Scan: Image Reviewed (scattered tree-in-bud infiltrates)





Problem List





- Problems


(1) COPD exacerbation


Code(s): J44.1 - CHRONIC OBSTRUCTIVE PULMONARY DISEASE W (ACUTE) EXACERBATION   





Assessment/Plan





Acute COPD Exacerbation


Acute Bronchitis


HTN


Hyperlipidemia


Nonspecific Lung Nodules





-  IV medrol


-  inhaled bronchodilators standing and PRN


-  antibiotics


-  f/u cultures


-  o2 to keep SpO2 >90%


-  outpt f/u of lung nodules


-  DVT prophylaxis





Thank you for this consult


Maximiliano Warren MD

## 2019-06-04 NOTE — CONSULT
Consult - text type





- Consultation


Consultation Note: 





Renal Consult for THOMPSON/CKD





This is a 74 year old  woman with hx of COPD, Arthritis, 

Nephrolithiasis who presented with 4 day history of cough and sob and admitted 

for COPD exacerbation with bronchitis and Cr of 1.7. Pt reports feeling better. 

No Abd pain, N/V/D. Making urine. No flank pain, dysuria. No chest pain. No HA/

confusion or lethargy. on IVF and antibiotics. 





PMhx: as above


Allergies: NDKA


Family Hx: NC


Social Hx: No T/A/D


ROS: as per HPI, all other ros negative


 Home Medications











 Medication  Instructions  Recorded


 


Atorvastatin Ca [Lipitor] 40 mg PO HS 06/28/13


 


Cholecalciferol (Vitamin D3) 1,000 unit PO DAILY 12/04/15





[Vitamin D3]  


 


Cyanocobalamin [Vitamin B12 -] 1,000 mcg PO DAILY 12/04/15


 


Omega-3 Fatty Acids [Fish Oil] 300 mg PO DAILY 12/04/15


 


Aspirin [ASA -] 81 mg PO DAILY 11/14/16


 


Metoprolol Succinate [Toprol XL -] 12.5 mg PO DAILY 11/14/16








 Vital Signs











Temperature  97.7 F   06/04/19 10:00


 


Pulse Rate  100 H  06/04/19 10:00


 


Respiratory Rate  22 H  06/04/19 10:00


 


Blood Pressure  122/71   06/04/19 10:00


 


O2 Sat by Pulse Oximetry (%)  97   06/04/19 09:00








 Intake & Output











 06/01/19 06/02/19 06/03/19 06/04/19





 23:59 23:59 23:59 23:59


 


Intake Total    625


 


Balance    625


 


Weight   87.589 kg 








NAD


awake and alert


neck supple, no JVD


RRR, no M/R


Dec BS, slight wheeze


soft NT/ND


no LE edema





 CBC, BMP





 06/04/19 06:15 





 06/04/19 06:15 





 Current Medications





Albuterol Sulfate (Ventolin 0.083% Nebulizer Soln -)  1 amp NEB RQ4H [facility]


   Last Admin: 06/04/19 07:20 Dose:  1 amp


Albuterol/Ipratropium (Duoneb -)  1 amp NEB RQID [facility]


   Last Admin: 06/04/19 11:10 Dose:  1 amp


Aspirin (Asa -)  81 mg PO DAILY [facility]


   Last Admin: 06/04/19 09:05 Dose:  81 mg


Atorvastatin Calcium (Lipitor -)  40 mg PO HS [facility]


   Last Admin: 06/03/19 23:37 Dose:  40 mg


Guaifenesin (Diabetic Tussin Dm -)  10 ml PO Q6H PRN


   PRN Reason: COUGH


   Last Admin: 06/04/19 06:38 Dose:  10 ml


Heparin Sodium (Porcine) (Heparin -)  5,000 unit SQ BID [facility]


   Last Admin: 06/04/19 09:06 Dose:  5,000 unit


Sodium Chloride (Normal Saline -)  1,000 mls @ 75 mls/hr IV ASDIR [facility]


   Last Admin: 06/03/19 23:37 Dose:  75 mls/hr


Ceftriaxone Sodium 1 gm/ (Dextrose)  50 mls @ 200 mls/hr IVPB DAILY [facility]; 

Protocol


   Last Admin: 06/04/19 09:07 Dose:  200 mls/hr


Azithromycin (Zithromax 500mg Ivpb (Pre-Docked))  500 mg in 250 mls @ 250 mls/

hr IVPB DAILY [facility]


   Stop: 06/08/19 10:59


   Last Admin: 06/04/19 12:16 Dose:  250 mls/hr


Insulin Aspart (Novolog Vial Sliding Scale -)  1 vial SQ ACHS [facility]; Protocol


   Last Admin: 06/04/19 12:16 Dose:  Not Given


Methylprednisolone Sodium Succinate (Solu-Medrol -)  40 mg IVPUSH Q8H-IV [facility]


   Last Admin: 06/04/19 09:07 Dose:  40 mg


Metoprolol Succinate (Toprol Xl -)  12.5 mg PO DAILY [facility]


   Last Admin: 06/04/19 09:07 Dose:  12.5 mg





74 year old  woman with hx of COPD, Arthritis, Nephrolithiasis who 

presented with 4 day history of cough and sob and admitted for COPD 

exacerbation with bronchitis and Cr of 1.7.





#THOMPSON vs. CKD


#COPD exacerbation


#Bronchitis


#Hyperkalemia, now resolved


#Hyperlipidemia





Suspect that change in renal function is due to hypovolemia in setting of 

infection 


Check urine studies for FeNa


Check Renal US to r/o obstruction given hx fo kidney stones


K is improved with hydration 


suspect that BUN may rise with IV steroids


continue to trend renal function and electrolytes


no indication for renal replacement therapy





thank you 


Salazar Godinez DO

## 2019-06-04 NOTE — PN
Progress Note, Physician





- Current Medication List


Current Medications: 


Active Medications





Albuterol Sulfate (Ventolin 0.083% Nebulizer Soln -)  1 amp NEB RQ4H [facility]


   Last Admin: 06/04/19 04:15 Dose:  1 amp


Aspirin (Asa -)  81 mg PO DAILY [facility]


Atorvastatin Calcium (Lipitor -)  40 mg PO HS [facility]


   Last Admin: 06/03/19 23:37 Dose:  40 mg


Guaifenesin (Diabetic Tussin Dm -)  10 ml PO Q6H PRN


   PRN Reason: COUGH


   Last Admin: 06/04/19 06:38 Dose:  10 ml


Heparin Sodium (Porcine) (Heparin -)  5,000 unit SQ BID [facility]


   Last Admin: 06/03/19 23:38 Dose:  5,000 unit


Sodium Chloride (Normal Saline -)  1,000 mls @ 75 mls/hr IV ASDIR [facility]


   Last Admin: 06/03/19 23:37 Dose:  75 mls/hr


Insulin Aspart (Novolog Vial Sliding Scale -)  1 vial SQ ACHS [facility]; Protocol


   Last Admin: 06/04/19 06:38 Dose:  2 units


Ipratropium Bromide (Atrovent 0.02% Nebulizer -)  1 amp NEB Q6H PRN


   PRN Reason: WHEEZING


Methylprednisolone Sodium Succinate (Solu-Medrol -)  40 mg IVPUSH Q8H-IV [facility]


   Last Admin: 06/04/19 02:41 Dose:  40 mg


Metoprolol Succinate (Toprol Xl -)  12.5 mg PO DAILY [facility]











- Objective


Vital Signs: 


 Vital Signs











Temperature  97.8 F   06/04/19 06:44


 


Pulse Rate  114 H  06/04/19 06:44


 


Respiratory Rate  24 H  06/04/19 06:44


 


Blood Pressure  128/79   06/04/19 06:44


 


O2 Sat by Pulse Oximetry (%)  99   06/03/19 21:00











Cardiovascular: Yes: S1, S2


Respiratory: Yes: Rhonchi, Wheezes


Gastrointestinal: Yes: Normal Bowel Sounds, Soft


Labs: 


 CBC, BMP





 06/04/19 06:15 





 06/04/19 06:15 











Problem List





- Problems


(1) COPD exacerbation


Assessment/Plan: 





-IV steroids


-inhaled bronchodilators


-Ct scan


-Pulm consult


-ROCEPHIN


-mucolytics


-supplemental 02 as needed, keep sats > 92%


-BNP elevated, CXR no evidence of fluid overload, clinically not in heart 

failure








Code(s): J44.1 - CHRONIC OBSTRUCTIVE PULMONARY DISEASE W (ACUTE) EXACERBATION   





(2) THOMPSON (acute kidney injury)


Assessment/Plan: 





-gentle IV fluids


-Renal consult


Code(s): N17.9 - ACUTE KIDNEY FAILURE, UNSPECIFIED   





(3) HTN (hypertension)


Assessment/Plan: 


Monitor


Code(s): I10 - ESSENTIAL (PRIMARY) HYPERTENSION

## 2019-06-04 NOTE — EKG
Test Reason : 

Blood Pressure : ***/*** mmHG

Vent. Rate : 101 BPM     Atrial Rate : 101 BPM

   P-R Int : 138 ms          QRS Dur : 078 ms

    QT Int : 344 ms       P-R-T Axes : [phone] degrees

   QTc Int : 446 ms

 

SINUS TACHYCARDIA WITH PREMATURE ATRIAL COMPLEXES

POSSIBLE LEFT ATRIAL ENLARGEMENT

BORDERLINE ECG

WHEN COMPARED WITH ECG OF 14-NOV-2016 16:57,

PREMATURE ATRIAL COMPLEXES ARE NOW PRESENT

T WAVE INVERSION NO LONGER EVIDENT IN LATERAL LEADS

Confirmed by MD MAURY, ADAM ([id number]) on 6/4/2019 10:01:56 AM

 

Referred By:             Confirmed By:ADAM MENDIOLA MD

## 2019-06-05 LAB
ALBUMIN SERPL-MCNC: 3 G/DL (ref 3.4–5)
ALP SERPL-CCNC: 70 U/L (ref 45–117)
ALT SERPL-CCNC: 30 U/L (ref 13–61)
ANION GAP SERPL CALC-SCNC: 8 MMOL/L (ref 8–16)
ANISOCYTOSIS BLD QL: 0
AST SERPL-CCNC: 30 U/L (ref 15–37)
BASOPHILS # BLD: 0 % (ref 0–2)
BILIRUB SERPL-MCNC: 0.3 MG/DL (ref 0.2–1)
BUN SERPL-MCNC: 37 MG/DL (ref 7–18)
CALCIUM SERPL-MCNC: 9 MG/DL (ref 8.5–10.1)
CHLORIDE SERPL-SCNC: 102 MMOL/L (ref 98–107)
CO2 SERPL-SCNC: 27 MMOL/L (ref 21–32)
CREAT SERPL-MCNC: 1.2 MG/DL (ref 0.55–1.3)
DEPRECATED RDW RBC AUTO: 13.5 % (ref 11.6–15.6)
EOSINOPHIL # BLD: 0 % (ref 0–4.5)
GLUCOSE SERPL-MCNC: 182 MG/DL (ref 74–106)
HCT VFR BLD CALC: 35.5 % (ref 32.4–45.2)
HGB BLD-MCNC: 11.9 GM/DL (ref 10.7–15.3)
LYMPHOCYTES # BLD: 3.2 % (ref 8–40)
MACROCYTES BLD QL: 0
MAGNESIUM SERPL-MCNC: 2.7 MG/DL (ref 1.8–2.4)
MCH RBC QN AUTO: 32 PG (ref 25.7–33.7)
MCHC RBC AUTO-ENTMCNC: 33.5 G/DL (ref 32–36)
MCV RBC: 95.7 FL (ref 80–96)
MONOCYTES # BLD AUTO: 4.5 % (ref 3.8–10.2)
NEUTROPHILS # BLD: 92.3 % (ref 42.8–82.8)
PHOSPHATE SERPL-MCNC: 2.6 MG/DL (ref 2.5–4.9)
PLATELET # BLD AUTO: 249 K/MM3 (ref 134–434)
PLATELET BLD QL SMEAR: NORMAL
PMV BLD: 7.1 FL (ref 7.5–11.1)
POTASSIUM SERPLBLD-SCNC: 3.9 MMOL/L (ref 3.5–5.1)
PROT SERPL-MCNC: 6.1 G/DL (ref 6.4–8.2)
RBC # BLD AUTO: 3.71 M/MM3 (ref 3.6–5.2)
SODIUM SERPL-SCNC: 137 MMOL/L (ref 136–145)
WBC # BLD AUTO: 14.4 K/MM3 (ref 4–10)

## 2019-06-05 RX ADMIN — THIAMINE HYDROCHLORIDE SCH MG: 100 INJECTION, SOLUTION INTRAMUSCULAR; INTRAVENOUS at 15:12

## 2019-06-05 RX ADMIN — HEPARIN SODIUM SCH UNIT: 5000 INJECTION, SOLUTION INTRAVENOUS; SUBCUTANEOUS at 21:39

## 2019-06-05 RX ADMIN — FLUTICASONE PROPIONATE SCH: 50 SPRAY, METERED NASAL at 22:47

## 2019-06-05 RX ADMIN — BENZOCAINE AND MENTHOL PRN EACH: 15; 3.6 LOZENGE ORAL at 09:19

## 2019-06-05 RX ADMIN — IPRATROPIUM BROMIDE AND ALBUTEROL SULFATE SCH AMP: .5; 3 SOLUTION RESPIRATORY (INHALATION) at 16:59

## 2019-06-05 RX ADMIN — METHYLPREDNISOLONE SODIUM SUCCINATE SCH MG: 40 INJECTION, POWDER, FOR SOLUTION INTRAMUSCULAR; INTRAVENOUS at 16:59

## 2019-06-05 RX ADMIN — IPRATROPIUM BROMIDE AND ALBUTEROL SULFATE SCH AMP: .5; 3 SOLUTION RESPIRATORY (INHALATION) at 20:41

## 2019-06-05 RX ADMIN — CEFTRIAXONE SCH MLS/HR: 1 INJECTION, POWDER, FOR SOLUTION INTRAMUSCULAR; INTRAVENOUS at 09:21

## 2019-06-05 RX ADMIN — METHYLPREDNISOLONE SODIUM SUCCINATE SCH MG: 40 INJECTION, POWDER, FOR SOLUTION INTRAMUSCULAR; INTRAVENOUS at 02:23

## 2019-06-05 RX ADMIN — QUETIAPINE FUMARATE SCH MG: 25 TABLET ORAL at 21:41

## 2019-06-05 RX ADMIN — ASPIRIN 81 MG SCH MG: 81 TABLET ORAL at 09:20

## 2019-06-05 RX ADMIN — METHYLPREDNISOLONE SODIUM SUCCINATE SCH MG: 40 INJECTION, POWDER, FOR SOLUTION INTRAMUSCULAR; INTRAVENOUS at 09:22

## 2019-06-05 RX ADMIN — INSULIN ASPART SCH: 100 INJECTION, SOLUTION INTRAVENOUS; SUBCUTANEOUS at 06:12

## 2019-06-05 RX ADMIN — HEPARIN SODIUM SCH UNIT: 5000 INJECTION, SOLUTION INTRAVENOUS; SUBCUTANEOUS at 09:21

## 2019-06-05 RX ADMIN — SODIUM CHLORIDE SCH MLS/HR: 9 INJECTION, SOLUTION INTRAVENOUS at 07:37

## 2019-06-05 RX ADMIN — CYANOCOBALAMIN SCH MCG: 1000 INJECTION, SOLUTION INTRAMUSCULAR at 13:41

## 2019-06-05 RX ADMIN — FOLIC ACID SCH MG: 1 TABLET ORAL at 12:27

## 2019-06-05 RX ADMIN — ATORVASTATIN CALCIUM SCH MG: 40 TABLET, FILM COATED ORAL at 21:40

## 2019-06-05 RX ADMIN — IPRATROPIUM BROMIDE AND ALBUTEROL SULFATE SCH AMP: .5; 3 SOLUTION RESPIRATORY (INHALATION) at 08:00

## 2019-06-05 RX ADMIN — LORATADINE SCH MG: 10 TABLET ORAL at 16:30

## 2019-06-05 RX ADMIN — AZITHROMYCIN DIHYDRATE SCH MLS/HR: 500 INJECTION, POWDER, LYOPHILIZED, FOR SOLUTION INTRAVENOUS at 10:15

## 2019-06-05 RX ADMIN — INSULIN ASPART SCH UNITS: 100 INJECTION, SOLUTION INTRAVENOUS; SUBCUTANEOUS at 12:25

## 2019-06-05 RX ADMIN — SODIUM CHLORIDE SCH MLS/HR: 9 INJECTION, SOLUTION INTRAVENOUS at 21:40

## 2019-06-05 RX ADMIN — INSULIN ASPART SCH: 100 INJECTION, SOLUTION INTRAVENOUS; SUBCUTANEOUS at 21:41

## 2019-06-05 RX ADMIN — INSULIN ASPART SCH UNITS: 100 INJECTION, SOLUTION INTRAVENOUS; SUBCUTANEOUS at 16:31

## 2019-06-05 RX ADMIN — IPRATROPIUM BROMIDE AND ALBUTEROL SULFATE SCH AMP: .5; 3 SOLUTION RESPIRATORY (INHALATION) at 15:39

## 2019-06-05 NOTE — PN
Progress Note (short form)





- Note


Progress Note: 





Renal follow up for THOMPSON





Pt seen and examined at the bedside


awake and alert


sob slightly improved, cough persists


no chest pain, fever or chills 





 Vital Signs











Temperature  97.8 F   06/05/19 07:56


 


Pulse Rate  103 H  06/05/19 07:56


 


Respiratory Rate  20   06/05/19 09:00


 


Blood Pressure  147/75   06/05/19 07:56


 


O2 Sat by Pulse Oximetry (%)  93 L  06/05/19 09:00








 Intake & Output











 06/02/19 06/03/19 06/04/19 06/05/19





 23:59 23:59 23:59 23:59


 


Intake Total   1865 525


 


Balance   1865 525


 


Weight  87.589 kg  








NAD


RRR, no M/R


Dec BS, slight wheeze


soft NT/ND


no LE edema





 CBC, BMP





 06/05/19 08:30 





 06/05/19 08:30 





 Current Medications





Acetaminophen (Tylenol -)  650 mg PO Q8H PRN


   PRN Reason: PAIN LEVEL 1-5


   Last Admin: 06/05/19 13:40 Dose:  650 mg


Albuterol/Ipratropium (Duoneb -)  1 amp NEB RQID [facility]


   Last Admin: 06/04/19 20:09 Dose:  1 amp


Aspirin (Asa -)  81 mg PO DAILY [facility]


   Last Admin: 06/05/19 09:20 Dose:  81 mg


Atorvastatin Calcium (Lipitor -)  40 mg PO HS [facility]


   Last Admin: 06/04/19 21:46 Dose:  40 mg


Benzocaine/Menthol (Cepacol Lozenge -)  1 each MM Q2H PRN


   PRN Reason: SORE THROAT


   Last Admin: 06/05/19 09:19 Dose:  1 each


Carbidopa/Levodopa (Sinemet *Cr* 25/100 -)  0.5 combo PO TIDCM [facility]


   Stop: 06/08/19 12:01


Carbidopa/Levodopa (Sinemet *Cr* 25/100 -)  1 combo PO TIDCM [facility]


Cyanocobalamin (Vitamin B12 Injection -)  1,000 mcg IM DAILY [facility]


   Stop: 06/11/19 10:01


   Last Admin: 06/05/19 13:41 Dose:  1,000 mcg


Fluticasone Propionate (Flonase -)  1 spray NS BID [facility]


Folic Acid (Folic Acid -)  1 mg PO DAILY [facility]


   Last Admin: 06/05/19 12:27 Dose:  1 mg


Guaifenesin (Diabetic Tussin Dm -)  10 ml PO Q6H PRN


   PRN Reason: COUGH


   Last Admin: 06/04/19 06:38 Dose:  10 ml


Heparin Sodium (Porcine) (Heparin -)  5,000 unit SQ BID [facility]


   Last Admin: 06/05/19 09:21 Dose:  5,000 unit


Sodium Chloride (Normal Saline -)  1,000 mls @ 75 mls/hr IV ASDIR [facility]


   Last Admin: 06/05/19 07:37 Dose:  75 mls/hr


Ceftriaxone Sodium 1 gm/ (Dextrose)  50 mls @ 200 mls/hr IVPB DAILY [facility]; 

Protocol


   Last Admin: 06/05/19 09:21 Dose:  200 mls/hr


Azithromycin (Zithromax 500mg Ivpb (Pre-Docked))  500 mg in 250 mls @ 250 mls/

hr IVPB DAILY [facility]


   Stop: 06/08/19 10:59


   Last Admin: 06/05/19 10:15 Dose:  250 mls/hr


Insulin Aspart (Novolog Vial Sliding Scale -)  1 vial SQ ACHS [facility]; Protocol


   Last Admin: 06/05/19 12:25 Dose:  4 units


Methylprednisolone Sodium Succinate (Solu-Medrol -)  40 mg IVPUSH Q8H-IV [facility]


   Last Admin: 06/05/19 09:22 Dose:  40 mg


Metoprolol Succinate (Toprol Xl -)  12.5 mg PO DAILY [facility]


   Last Admin: 06/05/19 09:19 Dose:  12.5 mg


Quetiapine Fumarate (Seroquel -)  12.5 mg PO Q12H [facility]


Thiamine HCl (Vitamin B1 Injection -)  200 mg IVPB DAILY [facility]











74 year old  woman with hx of COPD, Arthritis, Nephrolithiasis who 

presented with 4 day history of cough and sob and admitted for COPD 

exacerbation with bronchitis and Cr of 1.7.





#THOMPSON due to hypovolemic in setting of acute infection 


#COPD exacerbation


#Bronchitis


#Hyperkalemia, now resolved


#Hyperlipidemia





Renal function improved s/p IVF


can discontinue IVF, encourged oral hydration


BUN may rise in setting of steroids


US of the kidney showed no acute obstruction, stones 


continue to trend renal function and electrolytes


continue steroids and antibiotics as per pulmonary





thank you 


Salazar Godinez DO

## 2019-06-05 NOTE — CONSULT
Consult - text type





- Consultation


Consultation Note: 


NEUROLOGY CONSULT GREATLY APPRECIATED:


Events reviewed and discussed with nursing staff. Daughter at bedside aiding in 

history.





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 

Ambulates with cane. Has help cleaning home, but otherwise independent in ADL's.


PMHx HTN, HLD, DM, "Migraines," neck/back "arthritis," and insomnia on: lipitor

, b12, omega 3, ASA 81, Toprol 12.5 mg QD, metformin, Vicoden 7.5mg Q6H.


Surgical history: R THR, L wrist fracture after fall.


Admitted after acute SOB, cough and dx bronchitis, now on IV azithromycin, 

ceftriaxone, and IV Medrol. 


Now with headache at vertex "milder" from her "old migraines" that improved 

after menopause. Somewhat relieved with Tylenol. 





Both daughter and pt note approximately 2 years of progressive slowing and gait 

decline, with more frequent falls.


Previous neurological workup revealed "arthritis" in the neck and back with 

reportedly "normal" EMG's of the legs. 


She was prescribed medication for arthritis, however notes she has not taken 

the medication. 





Review of systems significant for "tremors" her whole life and chronic insomnia 

with "numbness," "tingling" and "pains" in the legs and hands that awaken her 

from sleep. She has awoken due to vivid dreams and sometimes believes there is 

a feeling of "presence." She has more recently accused her daughter of cheating 

and possibly stealing her money. Daughter is concerned about mom's memory 

"waxing and waning" and recent problems with vision/depth perception while 

driving


FH++ daughter with migraines, restless limbs, and essential tremor





PAMELLA: Cor reg. No bruit. Neck supple. Wheezing and coughing. 


NEURO: Awake, alert, OX "[facility]" June. Wednesday 2019. TRUMP-> PMURT. 2/3 recall 

@ 3 min.


           CNII-CNXII: EOM's full with full fields. No facial. Gag ok.


           Motor: No drift. Fine rest tremor of R thumb. +cogwheeling present B/

L with reinforcement. Decreased DAVIS's L > R. Strength normal. Plantars silent.


           Coordination: No FTN dystaxia but slowed.


           Sensation: Normal to vibration. Romberg +/-


           Gait: Sl flexed, shuffling. 4 steps to correct retropulsion. 





Impression: Mild B/L Cerebral dysfunction (CNS microvascular changes) worsened 

by Toxic-Metabolic Encephalopathy (TME)


                Migraine Headaches


                Restless Limbs Syndrome


                Essential tremor -> Parkinsonian tremor (probably Parkinson's 

Disease) 


                REM Sleep Behavioral disorder 





Suggest: Continue antibiotics and hydration


             Await CT of head (C-). Review prior w/u for MRI of brain and C-

spine 


             Check Orthostatics, ESR, CRP, Iron panel, TSH, B12, RPR


             Continue metoprolol 12.5 mg for migraine prophylaxis 


             Add Sinemet Cr 25/100 0.5 tab po @ 7,12,5 with meals x 1 week, 

then increase to 1 pill PO TID @ 7, 12 and 5.


             Add quetiapine 12.5 mg po q12H. May benefit from Pimavanserin (

Nuplazid) as out patient


             Pt eval with walker for gait safety.


             Neuro f/u as out patient for continued management.





Thank you very much, 


Quang Garcia MD

## 2019-06-05 NOTE — CON.PSY
Psychiatry Consult


Chief Complaint: 74 Year old female seen for Psych eval for Visual 

hallucinations at home. Patient had been taking anti histamines for allergies 

and has severe ASthma. Seen by [facility] ? parkinson's Disease with ? Psychosis. 

No Psych history.


Symptoms: reports: Hallucinations





- Previous Psychiatric Treatment


Outpatient: None


Inpatient: None





- Previous Substance Abuse Treatment


Outpatient: None


Inpatient: None





- Current Medications


Current Medications: 


Active Medications





Acetaminophen (Tylenol -)  650 mg PO Q8H PRN


   PRN Reason: PAIN LEVEL 1-5


   Last Admin: 06/05/19 13:40 Dose:  650 mg


Albuterol/Ipratropium (Duoneb -)  1 amp NEB RQID [facility]


   Last Admin: 06/05/19 16:59 Dose:  1 amp


Aspirin (Asa -)  81 mg PO DAILY [facility]


   Last Admin: 06/05/19 09:20 Dose:  81 mg


Atorvastatin Calcium (Lipitor -)  40 mg PO HS [facility]


   Last Admin: 06/04/19 21:46 Dose:  40 mg


Benzocaine/Menthol (Cepacol Lozenge -)  1 each MM Q2H PRN


   PRN Reason: SORE THROAT


   Last Admin: 06/05/19 09:19 Dose:  1 each


Carbidopa/Levodopa (Sinemet *Cr* 25/100 -)  0.5 combo PO TIDCM [facility]


   Stop: 06/08/19 12:01


   Last Admin: 06/05/19 16:30 Dose:  0.5 combo


Carbidopa/Levodopa (Sinemet *Cr* 25/100 -)  1 combo PO TIDCM [facility]


Cyanocobalamin (Vitamin B12 Injection -)  1,000 mcg IM DAILY [facility]


   Stop: 06/11/19 10:01


   Last Admin: 06/05/19 13:41 Dose:  1,000 mcg


Fluticasone Propionate (Flonase -)  1 spray NS BID [facility]


Folic Acid (Folic Acid -)  1 mg PO DAILY [facility]


   Last Admin: 06/05/19 12:27 Dose:  1 mg


Guaifenesin (Diabetic Tussin Dm -)  10 ml PO Q6H PRN


   PRN Reason: COUGH


   Last Admin: 06/04/19 06:38 Dose:  10 ml


Heparin Sodium (Porcine) (Heparin -)  5,000 unit SQ BID [facility]


   Last Admin: 06/05/19 09:21 Dose:  5,000 unit


Sodium Chloride (Normal Saline -)  1,000 mls @ 75 mls/hr IV ASDIR [facility]


   Last Admin: 06/05/19 07:37 Dose:  75 mls/hr


Ceftriaxone Sodium 1 gm/ (Dextrose)  50 mls @ 200 mls/hr IVPB DAILY [facility]; 

Protocol


   Last Admin: 06/05/19 09:21 Dose:  200 mls/hr


Azithromycin (Zithromax 500mg Ivpb (Pre-Docked))  500 mg in 250 mls @ 250 mls/

hr IVPB DAILY [facility]


   Stop: 06/08/19 10:59


   Last Admin: 06/05/19 10:15 Dose:  250 mls/hr


Insulin Aspart (Novolog Vial Sliding Scale -)  1 vial SQ ACHS [facility]; Protocol


   Last Admin: 06/05/19 16:31 Dose:  2 units


Loratadine (Claritin -)  10 mg PO DAILY [facility]


   Last Admin: 06/05/19 16:30 Dose:  10 mg


Methylprednisolone Sodium Succinate (Solu-Medrol -)  40 mg IVPUSH Q8H-IV [facility]


   Last Admin: 06/05/19 16:59 Dose:  40 mg


Metoprolol Succinate (Toprol Xl -)  12.5 mg PO DAILY [facility]


   Last Admin: 06/05/19 09:19 Dose:  12.5 mg


Quetiapine Fumarate (Seroquel -)  12.5 mg PO Q12H [facility]


Sodium Chloride (Ocean Spray Nasal Spray -)  2 spray NS TID PRN


   PRN Reason: NASAL CONGESTION


Thiamine HCl (Vitamin B1 Injection -)  200 mg IVPB DAILY [facility]


   Last Admin: 06/05/19 15:12 Dose:  200 mg











- Allergies


Allergies: 


 Allergies











Allergy/AdvReac Type Severity Reaction Status Date / Time


 


No Known Drug Allergies Allergy   Verified 06/03/19 20:56














- Current Living Status


Usual Living Arrangement: Alone





- Current Mental Status Evaluation


Appearance: Well Groomed


Attitude: Cooperative





- Affect


Affect: Full Range


Appropriateness: Appropriate to Content





- Mood


Mood: Euthymic





- Speech/Language


Expressive: Coherent





- Psychomotor Activity


Psychomotor Activity: Normal





- Thought Process


Thought Process: Intact





- Thought Content


Hallucinations: Absent


Delusions: Absent





- Self Perception


Self Perception: No Impairment





- Cognition


Attention: Alert


Orientation: Time


Memory, Immediate Recall: Intact


Memory, Short Term: 3/3


Memory, Remote with Prompting: 3/3





- Concentration


Serial Sevens Intact: No


Simple Calculations Intact: Yes





- Abstraction


Proverb Interpretation: Intact


Judgement: Intact





- Insight


Insight: Intact





- Impulse Control


Impulse Control: Good Control





- Suicidal Ideation


Suicidal Ideation: No





- Homicidal Ideation


Homicidal Ideation: No





Assessment/Plan





1) agree with SEroquel 12.5mg po hs.

## 2019-06-05 NOTE — PN
Progress Note, Physician


Chief Complaint: 





AWAKE CONFUSED


HALLUCINATIONS OVERNIGHT





- Current Medication List


Current Medications: 


Active Medications





Acetaminophen (Tylenol -)  650 mg PO Q8H PRN


   PRN Reason: PAIN LEVEL 1-5


   Last Admin: 06/05/19 13:40 Dose:  650 mg


Albuterol/Ipratropium (Duoneb -)  1 amp NEB RQID [facility]


   Last Admin: 06/04/19 20:09 Dose:  1 amp


Aspirin (Asa -)  81 mg PO DAILY [facility]


   Last Admin: 06/05/19 09:20 Dose:  81 mg


Atorvastatin Calcium (Lipitor -)  40 mg PO HS [facility]


   Last Admin: 06/04/19 21:46 Dose:  40 mg


Benzocaine/Menthol (Cepacol Lozenge -)  1 each MM Q2H PRN


   PRN Reason: SORE THROAT


   Last Admin: 06/05/19 09:19 Dose:  1 each


Carbidopa/Levodopa (Sinemet *Cr* 25/100 -)  0.5 combo PO TIDCM [facility]


   Stop: 06/08/19 12:01


Carbidopa/Levodopa (Sinemet *Cr* 25/100 -)  1 combo PO TIDCM [facility]


Cyanocobalamin (Vitamin B12 Injection -)  1,000 mcg IM DAILY [facility]


   Stop: 06/11/19 10:01


   Last Admin: 06/05/19 13:41 Dose:  1,000 mcg


Fluticasone Propionate (Flonase -)  1 spray NS BID [facility]


Folic Acid (Folic Acid -)  1 mg PO DAILY [facility]


   Last Admin: 06/05/19 12:27 Dose:  1 mg


Guaifenesin (Diabetic Tussin Dm -)  10 ml PO Q6H PRN


   PRN Reason: COUGH


   Last Admin: 06/04/19 06:38 Dose:  10 ml


Heparin Sodium (Porcine) (Heparin -)  5,000 unit SQ BID [facility]


   Last Admin: 06/05/19 09:21 Dose:  5,000 unit


Sodium Chloride (Normal Saline -)  1,000 mls @ 75 mls/hr IV ASDIR [facility]


   Last Admin: 06/05/19 07:37 Dose:  75 mls/hr


Ceftriaxone Sodium 1 gm/ (Dextrose)  50 mls @ 200 mls/hr IVPB DAILY [facility]; 

Protocol


   Last Admin: 06/05/19 09:21 Dose:  200 mls/hr


Azithromycin (Zithromax 500mg Ivpb (Pre-Docked))  500 mg in 250 mls @ 250 mls/

hr IVPB DAILY [facility]


   Stop: 06/08/19 10:59


   Last Admin: 06/05/19 10:15 Dose:  250 mls/hr


Insulin Aspart (Novolog Vial Sliding Scale -)  1 vial SQ ACHS [facility]; Protocol


   Last Admin: 06/05/19 12:25 Dose:  4 units


Loratadine (Claritin -)  10 mg PO DAILY [facility]


Methylprednisolone Sodium Succinate (Solu-Medrol -)  40 mg IVPUSH Q8H-IV [facility]


   Last Admin: 06/05/19 09:22 Dose:  40 mg


Metoprolol Succinate (Toprol Xl -)  12.5 mg PO DAILY [facility]


   Last Admin: 06/05/19 09:19 Dose:  12.5 mg


Quetiapine Fumarate (Seroquel -)  12.5 mg PO Q12H [facility]


Sodium Chloride (Ocean Spray Nasal Spray -)  2 spray NS TID PRN


   PRN Reason: NASAL CONGESTION


Thiamine HCl (Vitamin B1 Injection -)  200 mg IVPB DAILY [facility]


   Last Admin: 06/05/19 15:12 Dose:  200 mg











- Objective


Vital Signs: 


 Vital Signs











Temperature  98.0 F   06/05/19 14:45


 


Pulse Rate  111 H  06/05/19 14:40


 


Respiratory Rate  20   06/05/19 09:00


 


Blood Pressure  132/72   06/05/19 14:40


 


O2 Sat by Pulse Oximetry (%)  93 L  06/05/19 09:00











Constitutional: Yes: Mild Distress


Eyes: Yes: WNL


HENT: Yes: WNL


Neck: Yes: WNL


Cardiovascular: Yes: Regular Rate and Rhythm


Respiratory: Yes: Diminished, Tachypnea, Wheezes


Gastrointestinal: Yes: Soft


Genitourinary: Yes: WNL


Musculoskeletal: Yes: WNL


Extremities: Yes: WNL


Edema: Yes


Integumentary: Yes: WNL


Wound/Incision: Yes: Clean/Dry


Neurological: Yes: Pre-Existing Deficit


...Motor Strength: LLE, RLE


Psychiatric: Yes: Other


Labs: 


 CBC, BMP





 06/05/19 08:30 





 06/05/19 08:30 











Problem List





- Problems


(1) Toxic metabolic encephalopathy


Code(s): G92 - TOXIC ENCEPHALOPATHY   





(2) Altered mental status


Code(s): R41.82 - ALTERED MENTAL STATUS, UNSPECIFIED   





(3) THOMPSON (acute kidney injury)


Code(s): N17.9 - ACUTE KIDNEY FAILURE, UNSPECIFIED   





(4) COPD exacerbation


Code(s): J44.1 - CHRONIC OBSTRUCTIVE PULMONARY DISEASE W (ACUTE) EXACERBATION   





(5) Cough


Code(s): R05 - COUGH   





(6) HLD (hyperlipidemia)


Code(s): E78.5 - HYPERLIPIDEMIA, UNSPECIFIED   





(7) HTN (hypertension)


Code(s): I10 - ESSENTIAL (PRIMARY) HYPERTENSION   





(8) Prediabetes


Code(s): R73.03 - PREDIABETES   





Assessment/Plan





CT HEAD STAT R/O CVA


NEURO EVAL


AMS-TOXIC METABOLIC ENCEPHALOPATHY


IV ABX


NEBS


STEROIDS


PSYCH EVAL


PULM WORK UP

## 2019-06-05 NOTE — PN
Progress Note (short form)





- Note


Progress Note: 





PULMONARY





States breathing better but still with shortness of breath, cough and wheezing. 

No fevers or chills.





 Vital Signs











 Period  Temp  Pulse  Resp  BP Sys/Craft  Pulse Ox


 


 Last 24 Hr  97.6 F-98.7 F    20-22  113-147/62-80  93-99








Gen:  tachypneic with speaking


Heart: RRR


Lung: scattered rhonchi, wheezes


Abd: soft, nontender


Ext: no edema





 CBC, BMP





 06/05/19 08:30 





 06/05/19 08:30 





Active Medications





Acetaminophen (Tylenol -)  650 mg PO Q8H PRN


   PRN Reason: PAIN LEVEL 1-5


   Last Admin: 06/05/19 13:40 Dose:  650 mg


Albuterol/Ipratropium (Duoneb -)  1 amp NEB RQID [facility]


   Last Admin: 06/04/19 20:09 Dose:  1 amp


Aspirin (Asa -)  81 mg PO DAILY [facility]


   Last Admin: 06/05/19 09:20 Dose:  81 mg


Atorvastatin Calcium (Lipitor -)  40 mg PO HS [facility]


   Last Admin: 06/04/19 21:46 Dose:  40 mg


Benzocaine/Menthol (Cepacol Lozenge -)  1 each MM Q2H PRN


   PRN Reason: SORE THROAT


   Last Admin: 06/05/19 09:19 Dose:  1 each


Carbidopa/Levodopa (Sinemet *Cr* 25/100 -)  0.5 combo PO TIDCM [facility]


   Stop: 06/08/19 12:01


Carbidopa/Levodopa (Sinemet *Cr* 25/100 -)  1 combo PO TIDCM [facility]


Cyanocobalamin (Vitamin B12 Injection -)  1,000 mcg IM DAILY [facility]


   Stop: 06/11/19 10:01


   Last Admin: 06/05/19 13:41 Dose:  1,000 mcg


Folic Acid (Folic Acid -)  1 mg PO DAILY [facility]


   Last Admin: 06/05/19 12:27 Dose:  1 mg


Guaifenesin (Diabetic Tussin Dm -)  10 ml PO Q6H PRN


   PRN Reason: COUGH


   Last Admin: 06/04/19 06:38 Dose:  10 ml


Heparin Sodium (Porcine) (Heparin -)  5,000 unit SQ BID [facility]


   Last Admin: 06/05/19 09:21 Dose:  5,000 unit


Sodium Chloride (Normal Saline -)  1,000 mls @ 75 mls/hr IV [facility]


   Last Admin: 06/05/19 07:37 Dose:  75 mls/hr


Ceftriaxone Sodium 1 gm/ (Dextrose)  50 mls @ 200 mls/hr IVPB DAILY [facility]; 

Protocol


   Last Admin: 06/05/19 09:21 Dose:  200 mls/hr


Azithromycin (Zithromax 500mg Ivpb (Pre-Docked))  500 mg in 250 mls @ 250 mls/

hr IVPB DAILY [facility]


   Stop: 06/08/19 10:59


   Last Admin: 06/05/19 10:15 Dose:  250 mls/hr


Insulin Aspart (Novolog Vial Sliding Scale -)  1 vial SQ ACHS [facility]; Protocol


   Last Admin: 06/05/19 12:25 Dose:  4 units


Methylprednisolone Sodium Succinate (Solu-Medrol -)  40 mg IVPUSH Q8H-IV [facility]


   Last Admin: 06/05/19 09:22 Dose:  40 mg


Metoprolol Succinate (Toprol Xl -)  12.5 mg PO DAILY [facility]


   Last Admin: 06/05/19 09:19 Dose:  12.5 mg


Quetiapine Fumarate (Seroquel -)  12.5 mg PO Q12H [facility]


Thiamine HCl (Vitamin B1 Injection -)  200 mg IVPB DAILY [facility]





A/P


Acute COPD Exacerbation


Acute Bronchitis


HTN


Hyperlipidemia


Nonspecific Lung Nodules





-  continue medrol at current dose


-  inhaled bronchodilators standing and PRN


-  continue antibiotics


-  f/u cultures


-  o2 to keep SpO2 >90%


-  outpt f/u of lung nodules


-  DVT prophylaxis








Problem List





- Problems


(1) COPD exacerbation


Code(s): J44.1 - CHRONIC OBSTRUCTIVE PULMONARY DISEASE W (ACUTE) EXACERBATION

## 2019-06-06 LAB
ALBUMIN SERPL-MCNC: 3.3 G/DL (ref 3.4–5)
ALP SERPL-CCNC: 72 U/L (ref 45–117)
ALT SERPL-CCNC: 32 U/L (ref 13–61)
ANION GAP SERPL CALC-SCNC: 7 MMOL/L (ref 8–16)
ARTERIAL BLOOD GAS PCO2: 44.9 MMHG (ref 35–45)
ARTERIAL PATENCY WRIST A: POSITIVE
AST SERPL-CCNC: 33 U/L (ref 15–37)
BASE EXCESS BLDA CALC-SCNC: -0.1 MEQ/L (ref -2–2)
BASOPHILS # BLD: 0.1 % (ref 0–2)
BILIRUB SERPL-MCNC: 0.4 MG/DL (ref 0.2–1)
BUN SERPL-MCNC: 36 MG/DL (ref 7–18)
CALCIUM SERPL-MCNC: 9.1 MG/DL (ref 8.5–10.1)
CHLORIDE SERPL-SCNC: 106 MMOL/L (ref 98–107)
CO2 SERPL-SCNC: 27 MMOL/L (ref 21–32)
CREAT SERPL-MCNC: 1.2 MG/DL (ref 0.55–1.3)
DEPRECATED RDW RBC AUTO: 12.9 % (ref 11.6–15.6)
EOSINOPHIL # BLD: 0 % (ref 0–4.5)
GLUCOSE SERPL-MCNC: 184 MG/DL (ref 74–106)
HCT VFR BLD CALC: 38.8 % (ref 32.4–45.2)
HGB BLD-MCNC: 12.7 GM/DL (ref 10.7–15.3)
LYMPHOCYTES # BLD: 3.9 % (ref 8–40)
MAGNESIUM SERPL-MCNC: 2.4 MG/DL (ref 1.8–2.4)
MCH RBC QN AUTO: 31.7 PG (ref 25.7–33.7)
MCHC RBC AUTO-ENTMCNC: 32.7 G/DL (ref 32–36)
MCV RBC: 96.9 FL (ref 80–96)
MONOCYTES # BLD AUTO: 5.8 % (ref 3.8–10.2)
NEUTROPHILS # BLD: 90.2 % (ref 42.8–82.8)
PHOSPHATE SERPL-MCNC: 1.8 MG/DL (ref 2.5–4.9)
PLATELET # BLD AUTO: 278 K/MM3 (ref 134–434)
PMV BLD: 7.4 FL (ref 7.5–11.1)
PO2 BLDA: 70.5 MMHG (ref 80–105)
POTASSIUM SERPLBLD-SCNC: 4.1 MMOL/L (ref 3.5–5.1)
PROT SERPL-MCNC: 6.6 G/DL (ref 6.4–8.2)
RBC # BLD AUTO: 4 M/MM3 (ref 3.6–5.2)
SAO2 % BLDA: 92.9 % (ref 95–98)
SERUM IRON SATURATION: 43 % (ref 15–55)
SODIUM SERPL-SCNC: 139 MMOL/L (ref 136–145)
TIBC SERPL-MCNC: 237 UG/DL (ref 250–450)
UIBC SERPL-MCNC: 135 UG/DL (ref 118–369)
WBC # BLD AUTO: 14.2 K/MM3 (ref 4–10)

## 2019-06-06 RX ADMIN — HEPARIN SODIUM SCH UNIT: 5000 INJECTION, SOLUTION INTRAVENOUS; SUBCUTANEOUS at 09:40

## 2019-06-06 RX ADMIN — IPRATROPIUM BROMIDE AND ALBUTEROL SULFATE SCH AMP: .5; 3 SOLUTION RESPIRATORY (INHALATION) at 16:10

## 2019-06-06 RX ADMIN — AZITHROMYCIN DIHYDRATE SCH MLS/HR: 500 INJECTION, POWDER, LYOPHILIZED, FOR SOLUTION INTRAVENOUS at 09:35

## 2019-06-06 RX ADMIN — INSULIN ASPART SCH: 100 INJECTION, SOLUTION INTRAVENOUS; SUBCUTANEOUS at 06:26

## 2019-06-06 RX ADMIN — ATORVASTATIN CALCIUM SCH MG: 40 TABLET, FILM COATED ORAL at 22:09

## 2019-06-06 RX ADMIN — INSULIN ASPART SCH UNITS: 100 INJECTION, SOLUTION INTRAVENOUS; SUBCUTANEOUS at 17:13

## 2019-06-06 RX ADMIN — CYANOCOBALAMIN SCH MCG: 1000 INJECTION, SOLUTION INTRAMUSCULAR at 09:40

## 2019-06-06 RX ADMIN — GUAIFENESIN AND DEXTROMETHORPHAN HYDROBROMIDE PRN ML: 100; 10 SOLUTION ORAL at 17:14

## 2019-06-06 RX ADMIN — IPRATROPIUM BROMIDE AND ALBUTEROL SULFATE SCH AMP: .5; 3 SOLUTION RESPIRATORY (INHALATION) at 07:15

## 2019-06-06 RX ADMIN — FLUTICASONE PROPIONATE SCH: 50 SPRAY, METERED NASAL at 09:57

## 2019-06-06 RX ADMIN — THIAMINE HYDROCHLORIDE SCH MG: 100 INJECTION, SOLUTION INTRAMUSCULAR; INTRAVENOUS at 09:40

## 2019-06-06 RX ADMIN — INSULIN ASPART SCH: 100 INJECTION, SOLUTION INTRAVENOUS; SUBCUTANEOUS at 22:09

## 2019-06-06 RX ADMIN — LORATADINE SCH MG: 10 TABLET ORAL at 09:40

## 2019-06-06 RX ADMIN — QUETIAPINE FUMARATE SCH MG: 25 TABLET ORAL at 09:38

## 2019-06-06 RX ADMIN — FLUTICASONE PROPIONATE SCH: 50 SPRAY, METERED NASAL at 22:09

## 2019-06-06 RX ADMIN — IPRATROPIUM BROMIDE AND ALBUTEROL SULFATE SCH AMP: .5; 3 SOLUTION RESPIRATORY (INHALATION) at 19:05

## 2019-06-06 RX ADMIN — ASPIRIN 81 MG SCH MG: 81 TABLET ORAL at 09:36

## 2019-06-06 RX ADMIN — FOLIC ACID SCH MG: 1 TABLET ORAL at 09:40

## 2019-06-06 RX ADMIN — CEFTRIAXONE SCH MLS/HR: 1 INJECTION, POWDER, FOR SOLUTION INTRAMUSCULAR; INTRAVENOUS at 09:36

## 2019-06-06 RX ADMIN — IPRATROPIUM BROMIDE AND ALBUTEROL SULFATE SCH AMP: .5; 3 SOLUTION RESPIRATORY (INHALATION) at 11:33

## 2019-06-06 RX ADMIN — HEPARIN SODIUM SCH UNIT: 5000 INJECTION, SOLUTION INTRAVENOUS; SUBCUTANEOUS at 22:09

## 2019-06-06 RX ADMIN — GUAIFENESIN AND DEXTROMETHORPHAN HYDROBROMIDE PRN ML: 100; 10 SOLUTION ORAL at 10:16

## 2019-06-06 RX ADMIN — INSULIN ASPART SCH: 100 INJECTION, SOLUTION INTRAVENOUS; SUBCUTANEOUS at 12:48

## 2019-06-06 NOTE — RAPID
Physical Examination


Vital Signs: 


 Vital Signs











Temperature  97.7 F   06/05/19 22:00


 


Pulse Rate  82   06/05/19 22:00


 


Respiratory Rate  21 H  06/05/19 22:00


 


Blood Pressure  144/66   06/05/19 22:00


 


O2 Sat by Pulse Oximetry (%)  95   06/05/19 21:00











Labs: 


 CBC, BMP





 06/06/19 02:15 





 06/06/19 02:15 











Rapid Response





- Rapid Response


Assessment: 





Rapid response called for Pt. having hallucinations and agitated. Pt. known to 

have hallucinations and agitations per chart records, though daughter admits 

they have gotten worse since starting the seroquel. On Physical exam VS were 

stable, mild wheezing anteriorly, nml s1 s2, however profound confusion A&O x1, 

Pt. cognizant that she is having hallucination however beleives that they are 

real and frustrated that no one else believes her. Pt. denies any commands from 

hallucinations. that she is  CBC, CMP, B12, ESR, RPR and 2mg Haldol x 2 ordered 

for agitation to good effect. Prior EKG showed QTc of 446. Discussed 

reorientation of Pt. with daughter, covering RN and Pt.

## 2019-06-06 NOTE — PN
Progress Note (short form)





- Note


Progress Note: 





Renal follow up for THOMPSON





Pt seen and examined at the bedside


awake and alert


chart reviewed and overnight and AM events discussed with daughter


pt is oriented x 3


asking about bugs under her bed


still confused 


 Vital Signs











Temperature  98 F   06/06/19 09:00


 


Pulse Rate  108 H  06/06/19 10:00


 


Respiratory Rate  20   06/06/19 10:00


 


Blood Pressure  126/85   06/06/19 10:00


 


O2 Sat by Pulse Oximetry (%)  98   06/06/19 09:00








 Intake & Output











 06/03/19 06/04/19 06/05/19 06/06/19





 23:59 23:59 23:59 23:59


 


Intake Total  1865 1875 400


 


Balance  1865 1875 400


 


Weight 87.589 kg  87.543 kg 











NAD


RRR, no M/R


Dec BS, slight wheeze


soft NT/ND


no LE edema


 CBC, BMP





 06/06/19 02:15 





 06/06/19 02:15 





 Current Medications





Acetaminophen (Tylenol -)  650 mg PO Q8H PRN


   PRN Reason: PAIN LEVEL 1-5


   Last Admin: 06/05/19 13:40 Dose:  650 mg


Albuterol Sulfate (Ventolin 0.083% Nebulizer Soln -)  1 amp NEB Q4H PRN


   PRN Reason: SHORT OF BREATH/WHEEZING


Albuterol/Ipratropium (Duoneb -)  1 amp NEB RQID [facility]


   Last Admin: 06/06/19 11:33 Dose:  1 amp


Aspirin (Asa -)  81 mg PO DAILY [facility]


   Last Admin: 06/06/19 09:36 Dose:  81 mg


Atorvastatin Calcium (Lipitor -)  40 mg PO HS [facility]


   Last Admin: 06/05/19 21:40 Dose:  40 mg


Benzocaine/Menthol (Cepacol Lozenge -)  1 each MM Q2H PRN


   PRN Reason: SORE THROAT


   Last Admin: 06/05/19 09:19 Dose:  1 each


Cyanocobalamin (Vitamin B12 Injection -)  1,000 mcg IM DAILY [facility]


   Stop: 06/11/19 10:01


   Last Admin: 06/06/19 09:40 Dose:  1,000 mcg


Fluticasone Propionate (Flonase -)  1 spray NS BID [facility]


   Last Admin: 06/06/19 09:57 Dose:  Not Given


Folic Acid (Folic Acid -)  1 mg PO DAILY [facility]


   Last Admin: 06/06/19 09:40 Dose:  1 mg


Guaifenesin (Diabetic Tussin Dm -)  10 ml PO Q6H PRN


   PRN Reason: COUGH


   Last Admin: 06/06/19 10:16 Dose:  10 ml


Heparin Sodium (Porcine) (Heparin -)  5,000 unit SQ BID [facility]


   Last Admin: 06/06/19 09:40 Dose:  5,000 unit


Sodium Chloride (Normal Saline -)  1,000 mls @ 75 mls/hr IV [facility]


   Last Admin: 06/05/19 21:40 Dose:  75 mls/hr


Ceftriaxone Sodium 1 gm/ (Dextrose)  50 mls @ 200 mls/hr IVPB DAILY [facility]; 

Protocol


   Last Admin: 06/06/19 09:36 Dose:  200 mls/hr


Azithromycin (Zithromax 500mg Ivpb (Pre-Docked))  500 mg in 250 mls @ 250 mls/

hr IVPB DAILY [facility]


   Stop: 06/08/19 10:59


   Last Admin: 06/06/19 09:35 Dose:  250 mls/hr


Insulin Aspart (Novolog Vial Sliding Scale -)  1 vial SQ ACHS [facility]; Protocol


   Last Admin: 06/06/19 12:48 Dose:  Not Given


Loratadine (Claritin -)  10 mg PO DAILY [facility]


   Last Admin: 06/06/19 09:40 Dose:  10 mg


Methylprednisolone Sodium Succinate (Solu-Medrol -)  40 mg IVPUSH DAILY [facility]


Metoprolol Succinate (Toprol Xl -)  12.5 mg PO DAILY [facility]


   Last Admin: 06/06/19 09:38 Dose:  12.5 mg


Potassium Phos/Sodium Phos (Phos-Nak Packet -)  1 packet PO DAILY [facility]


Sodium Chloride (Ocean Spray Nasal Spray -)  2 spray NS TID PRN


   PRN Reason: NASAL CONGESTION


Thiamine HCl (Vitamin B1 Injection -)  200 mg IVPB DAILY [facility]


   Last Admin: 06/06/19 09:40 Dose:  200 mg














74 year old  woman with hx of COPD, Arthritis, Nephrolithiasis who 

presented with 4 day history of cough and sob and admitted for COPD 

exacerbation with bronchitis and Cr of 1.7.





#THOMPSON due to hypovolemic in setting of acute infection 


#COPD exacerbation


#Bronchitis


#Hyperkalemia, now resolved


#Hyperlipidemia


#Delirium 





Renal function improving as of yesterday


off IVF but tolerating oral diet


no new lab available to review today


check labs in AM


cause of delirium unclear, may be steroid related


Check UA and urine culture (pt with prior hx of UTI's)


Steroids being tapered by pulmonary


Neurology and psych following 





thank you 


Salazar Godinez DO

## 2019-06-06 NOTE — CONSULT
Admitting History and Physical





- Primary Care Physician


PCP: Denice Weiss





- Admission


History of Present Illness: 





75yo female with h/o HTN, hyperlipidemia, COPD who was admitted with worsening 

shortness of breath and cough x 3 days. Chest pain with coughing. No fevers, 

chills or sweats but was feeling generalized weakness. +cough productive of 

yellow sputum and wheezing.





RR-Condition 10 called when pt became physically and verbally abusive.-Agitated

, hallucinating 


 Selected Entries











  06/05/19 06/05/19 06/05/19





  06:00 07:56 09:27


 


Breakfast   25%


 


Lunch   


 


Supper   


 


Temperature 98 F 97.8 F 














  06/05/19 06/05/19 06/05/19





  14:40 14:45 18:00


 


Breakfast   


 


Lunch 25%  


 


Supper   


 


Temperature  98.0 F 97.5 F L














  06/05/19 06/05/19 06/06/19





  18:52 22:00 09:00


 


Breakfast   


 


Lunch   


 


Supper 25%  


 


Temperature  97.7 F 98 F














  06/06/19





  10:26


 


Breakfast 25%


 


Lunch 


 


Supper 


 


Temperature 








 Laboratory Tests











  06/05/19 06/06/19





  08:30 02:15


 


WBC  14.4 H  14.2 H








Per Neurology-


Impression:  Acute delirium 


                 REM sleep behavioral disorder


                 Parkinson's disease psychosis


                 All worsened by Toxic-Metabolic encephalopathy








This is my first consult with this pt.


History Source: Family Member, Medical Record


Limitations to Obtaining History: Clinical Condition





- Past Medical History


Cardiovascular: Yes: HTN, Hyperlipdemia


Pulmonary: Yes: COPD


Musculoskeletal: Yes: Osteoarthritis


ENT: Yes: Allergic Rhinitis


Endocrine: Yes: Diabetes Mellitus (Prediabetes)





- Past Surgical History


Past Surgical History: Yes: Joint Replacement (right hip replacement, left arm 

surgery)





- Smoking History


Smoking history: Former smoker (smoked over 1 ppd for over 40 years, quit 6 

years ago)


Have you smoked in the past 12 months: No


Aproximately how many cigarettes per day: 0


If you are a former smoker, when did you quit?: 2012





- Alcohol/Substance Use


Hx Alcohol Use: No


History of Substance Use: reports: None





- Social History


ADL: Independent


History of Recent Travel: No





History





- Admission


Reason For Visit: RESPIRATORY INFECTION





- Diagnostics


X-ray: Report Reviewed


CT Scan: Report Reviewed





- General


Mental Status: Awake and Alert, Vague, Intermittently Confused (improving but 

intermittent), Flat Affect


Attention: Mild Impairment


Ability to Follow Directions: Fair


Head/Neck Control: WFL





- Hearing


Hearing: Normal


Hearing Aide: No


With Patient: No





Speech Evaluation





- Communication


Primary Language: ENGLISH


Oral Expression Ability: Yes: Mild Impairment





- Speech Production


Able to Make Needs Known: Yes: WNL


Intelligibility: Yes: Mildly Impaired





- Speech Characteristics


Voice Loudness: Normal


Voice Pitch: Yes: Mildly Low


Voice Phonatory-based Quality: Yes: Hoarse, Harsh, Dysphonia, Vocal Wetness


Speech Clarity: < 100%


Articulation: Yes: Imprecise (mild)





- Language/Auditory Comprehension


Follows: Yes: 1 Stage Simple Commands


Observation: Able to respond to yes/no queries: Yes, Yes/No Confusion: No, 

Comprehends Conversational Speech: Yes





- Language/Verbal Expression


Able to Respond to Simple Queries: Yes: Mildly Impaired





- Swallow Evaluation/Bedside Assessment


Current Nutritional Intake: Regular, Thin Liquids


Oral Secretions: Yes: WFL, Tongue Coated (r/o Thrush- coated -Diabetic,Steroids)


Dentition: Yes: Adequate


Facial Symmetry at Rest: Symmetrical


Facial Symmetry on Retraction: Symmetrical


Facial Movement: Controlled


Against Resistance Opening: Normal


Against Resistance Closing: Normal


Pucker Lips: Normal


Lingual Movement: Symmetric


Lingual Movement Strgth Against Opposition: Reduced


Lingual Movement Characteristics: Normal


Velopharyngeal Movement: Normal


Laryngeal Movement: Labored,delay initiation


Rate of Intake: WFL


Labial Seal: WFL


Oral Prep Time: WFL


A-P Transit: WFL


Timing of Swallow: Delayed


Coughing/Throat Clear: Yes (cough with and without po trials. congested, harsh 

voice. Reports coughing )





Recommendations





- Speech Evaluation, Impression/Plan


Impression: Pt coughs with and without po trials. congested, harsh voice. 

Reports coughing on thin liquids, pills stick at times. Possible oral thrush. 

Confusion intermittent, reported to be improving?





- Dysphagia Impressions/Plan


Dysphagia Impressions: Mild Impairment, Risk of Aspiration


*Silent aspiration: cannot be R/O at bedside


Dysphagia Treatment Plan: OOB for meals, OOB for 1 h. after meals


Recommendations: Modified Barium Swallow (if responsive cough, vocal wetness)





- Recommendations


Diet Consistency: Other (monitor tolerance)


Liquids: Thin Liquids, Other (If cough, congestion increasing, downgrade to 

nectar thick liquid and MBS to r/o aspiration)

## 2019-06-06 NOTE — PN
Progress Note (short form)





- Note


Progress Note: 





Psych follow up: 


Patient became very agitated and began hallucinating and very disorganized and 

agitated. Placed on 1: 1 for safety.


MS: alert, appears very restless and agitated. Oriented to name , place and 

person. thinking is more disorganized than yesterday.. It appears to be 

precipitated by Prednisone.  Patient scheduled by  , will wait for 

his evaluation. Patient is not suicidal or Homicidal at this time.


PLan;1) Continue with 1:1.


2) Zyprexa 5mg po hs for agitation and Psychotic behaviour.

## 2019-06-06 NOTE — PN
Progress Note (short form)





- Note


Progress Note: 





PULMONARY





Agitated, hallucinating overnight. States breathing slightly better.





 Vital Signs











 Period  Temp  Pulse  Resp  BP Sys/Craft  Pulse Ox


 


 Last 24 Hr  97.5 F-98.0 F    20-21  126-147/63-85  95














Gen:  mildly tachypneic with speaking


Heart: RRR


Lung: les  rhonchi, wheezes


Abd: soft, nontender


Ext: no edema





 CBC, BMP





 06/06/19 02:15 





 06/06/19 02:15 





Active Medications





Acetaminophen (Tylenol -)  650 mg PO Q8H PRN


   PRN Reason: PAIN LEVEL 1-5


   Last Admin: 06/05/19 13:40 Dose:  650 mg


Albuterol/Ipratropium (Duoneb -)  1 amp NEB RQID [facility]


   Last Admin: 06/06/19 07:15 Dose:  1 amp


Aspirin (Asa -)  81 mg PO DAILY [facility]


   Last Admin: 06/06/19 09:36 Dose:  81 mg


Atorvastatin Calcium (Lipitor -)  40 mg PO HS [facility]


   Last Admin: 06/05/19 21:40 Dose:  40 mg


Benzocaine/Menthol (Cepacol Lozenge -)  1 each MM Q2H PRN


   PRN Reason: SORE THROAT


   Last Admin: 06/05/19 09:19 Dose:  1 each


Carbidopa/Levodopa (Sinemet *Cr* 25/100 -)  0.5 combo PO TIDCM [facility]


   Stop: 06/08/19 12:01


   Last Admin: 06/06/19 09:37 Dose:  0.5 combo


Carbidopa/Levodopa (Sinemet *Cr* 25/100 -)  1 combo PO TIDCM [facility]


Cyanocobalamin (Vitamin B12 Injection -)  1,000 mcg IM DAILY [facility]


   Stop: 06/11/19 10:01


   Last Admin: 06/06/19 09:40 Dose:  1,000 mcg


Fluticasone Propionate (Flonase -)  1 spray NS BID [facility]


   Last Admin: 06/06/19 09:57 Dose:  Not Given


Folic Acid (Folic Acid -)  1 mg PO DAILY [facility]


   Last Admin: 06/06/19 09:40 Dose:  1 mg


Guaifenesin (Diabetic Tussin Dm -)  10 ml PO Q6H PRN


   PRN Reason: COUGH


   Last Admin: 06/06/19 10:16 Dose:  10 ml


Heparin Sodium (Porcine) (Heparin -)  5,000 unit SQ BID [facility]


   Last Admin: 06/06/19 09:40 Dose:  5,000 unit


Sodium Chloride (Normal Saline -)  1,000 mls @ 75 mls/hr IV ASDIR [facility]


   Last Admin: 06/05/19 21:40 Dose:  75 mls/hr


Ceftriaxone Sodium 1 gm/ (Dextrose)  50 mls @ 200 mls/hr IVPB DAILY [facility]; 

Protocol


   Last Admin: 06/06/19 09:36 Dose:  200 mls/hr


Azithromycin (Zithromax 500mg Ivpb (Pre-Docked))  500 mg in 250 mls @ 250 mls/

hr IVPB DAILY [facility]


   Stop: 06/08/19 10:59


   Last Admin: 06/06/19 09:35 Dose:  250 mls/hr


Insulin Aspart (Novolog Vial Sliding Scale -)  1 vial SQ ACHS [facility]; Protocol


   Last Admin: 06/06/19 06:26 Dose:  Not Given


Loratadine (Claritin -)  10 mg PO DAILY [facility]


   Last Admin: 06/06/19 09:40 Dose:  10 mg


Methylprednisolone Sodium Succinate (Solu-Medrol -)  40 mg IVPUSH BID [facility]


   Last Admin: 06/06/19 09:38 Dose:  40 mg


Metoprolol Succinate (Toprol Xl -)  12.5 mg PO DAILY [facility]


   Last Admin: 06/06/19 09:38 Dose:  12.5 mg


Quetiapine Fumarate (Seroquel -)  12.5 mg PO Q12H [facility]


   Last Admin: 06/06/19 09:38 Dose:  12.5 mg


Sodium Chloride (Ocean Spray Nasal Spray -)  2 spray NS TID PRN


   PRN Reason: NASAL CONGESTION


Thiamine HCl (Vitamin B1 Injection -)  200 mg IVPB DAILY [facility]


   Last Admin: 06/06/19 09:40 Dose:  200 mg








A/P


Acute COPD Exacerbation


Acute Bronchitis/Sinusitis


HTN


Hyperlipidemia


Nonspecific Lung Nodules





-  will decrease medrol to daily to help minimize causes of delirium


-  inhaled bronchodilators standing and PRN


-  continue antibiotics


-  f/u cultures


-  o2 to keep SpO2 >90%


-  outpt f/u of lung nodules


-  DVT prophylaxis








Problem List





- Problems


(1) COPD exacerbation


Code(s): J44.1 - CHRONIC OBSTRUCTIVE PULMONARY DISEASE W (ACUTE) EXACERBATION

## 2019-06-06 NOTE — PN
Progress Note (short form)





- Note


Progress Note: 


NEUROLOGY PROGRESS:





Events reviewed and discussed with GLORIA Fair. Daughter and Speech therapist at 

bedside. Pulmonology consult read and appreciated.


Per daughter and staff, pt became agitated, "delusional"  and "hallucinating" 

in evening hours requiring 1:1 and IM Haldol and still did not sleep.


Pt received 2 doses of L-Dopa 12.5/50 before dinner and this AM, along with 

quetiapine 12.5 mg.


Medrol since reduced from 40 mg IVP Q8H to 40 mg IVP once daily per Pulm.


Daughter notes behavior has occurred before at home, but "not this extreme" and 

"usually when she is asleep" she is talking to herself and grasping things. 


Pt states "I don't want to talk about last night because no one will believe me.

"



WBC= 14.2 Iron 102 TIBC 237   Iron sat 43%   B12 > 6,000, TSH= 0.08!  RPR 

nonreactive





PAMELLA: Cushingnoid. Neck supple. Audible wheezing. Tongue discolored. 


NEURO: Awakens to name, speech fluent with periods of gibberish. Distracted, 

but can be redirected and follow commands. ? Hallucinating          


           Reports "[facility]" to "[facility]" "January" no "June." "2019." 

TRUMP.  


           CNII-CNXII: EOM's full. Full fields. No facial. Gag ok.


           Motor: No drift. Lessened sustention/grasp tremor. Strength normal. 

Reflexes brisk throughout. Plantars silent.


           Coordination: No FTN dystaxia.


           Sensation: Feel pinch in all fours





Impression:  Acute delirium 


                 REM sleep behavioral disorder


                 Parkinson's disease psychosis


                 All worsened by Toxic-Metabolic encephalopathy Infection/

steroids)





Suggest: Continue antibiotics and hydration. 


             Agree with lower doses of steroids per Pulm. 


             MRI of brain (C-)


             Endocrinology consult to evaluate hyperthryoid state. Check FT3, 

FT4.


             Observe off L- dopa and quetiapine at this time.


             Consider treatment of REM disorder with low doses of clonazepam 

0.25 mg HS.





Thank you very much, 


Quang Garcia MD

## 2019-06-06 NOTE — PN
Progress Note, Physician


Chief Complaint: 





AWAKE CONFUSED


DAUGHTER BEDSIDE


EVENTS AND NOTES REVIEWED








- Current Medication List


Current Medications: 


Active Medications





Acetaminophen (Tylenol -)  650 mg PO Q8H PRN


   PRN Reason: PAIN LEVEL 1-5


   Last Admin: 06/05/19 13:40 Dose:  650 mg


Albuterol Sulfate (Ventolin 0.083% Nebulizer Soln -)  1 amp NEB Q4H PRN


   PRN Reason: SHORT OF BREATH/WHEEZING


Albuterol/Ipratropium (Duoneb -)  1 amp NEB RQID [facility]


   Last Admin: 06/06/19 11:33 Dose:  1 amp


Aspirin (Asa -)  81 mg PO DAILY [facility]


   Last Admin: 06/06/19 09:36 Dose:  81 mg


Atorvastatin Calcium (Lipitor -)  40 mg PO HS [facility]


   Last Admin: 06/05/19 21:40 Dose:  40 mg


Benzocaine/Menthol (Cepacol Lozenge -)  1 each MM Q2H PRN


   PRN Reason: SORE THROAT


   Last Admin: 06/05/19 09:19 Dose:  1 each


Carbidopa/Levodopa (Sinemet *Cr* 25/100 -)  0.5 combo PO TIDCM [facility]


   Stop: 06/08/19 12:01


   Last Admin: 06/06/19 09:37 Dose:  0.5 combo


Carbidopa/Levodopa (Sinemet *Cr* 25/100 -)  1 combo PO TIDCM [facility]


Cyanocobalamin (Vitamin B12 Injection -)  1,000 mcg IM DAILY [facility]


   Stop: 06/11/19 10:01


   Last Admin: 06/06/19 09:40 Dose:  1,000 mcg


Fluticasone Propionate (Flonase -)  1 spray NS BID [facility]


   Last Admin: 06/06/19 09:57 Dose:  Not Given


Folic Acid (Folic Acid -)  1 mg PO DAILY [facility]


   Last Admin: 06/06/19 09:40 Dose:  1 mg


Guaifenesin (Diabetic Tussin Dm -)  10 ml PO Q6H PRN


   PRN Reason: COUGH


   Last Admin: 06/06/19 10:16 Dose:  10 ml


Heparin Sodium (Porcine) (Heparin -)  5,000 unit SQ BID [facility]


   Last Admin: 06/06/19 09:40 Dose:  5,000 unit


Sodium Chloride (Normal Saline -)  1,000 mls @ 75 mls/hr IV ASDIR [facility]


   Last Admin: 06/05/19 21:40 Dose:  75 mls/hr


Ceftriaxone Sodium 1 gm/ (Dextrose)  50 mls @ 200 mls/hr IVPB DAILY [facility]; 

Protocol


   Last Admin: 06/06/19 09:36 Dose:  200 mls/hr


Azithromycin (Zithromax 500mg Ivpb (Pre-Docked))  500 mg in 250 mls @ 250 mls/

hr IVPB DAILY [facility]


   Stop: 06/08/19 10:59


   Last Admin: 06/06/19 09:35 Dose:  250 mls/hr


Insulin Aspart (Novolog Vial Sliding Scale -)  1 vial SQ ACHS [facility]; Protocol


   Last Admin: 06/06/19 06:26 Dose:  Not Given


Loratadine (Claritin -)  10 mg PO DAILY [facility]


   Last Admin: 06/06/19 09:40 Dose:  10 mg


Methylprednisolone Sodium Succinate (Solu-Medrol -)  40 mg IVPUSH DAILY [facility]


Metoprolol Succinate (Toprol Xl -)  12.5 mg PO DAILY [facility]


   Last Admin: 06/06/19 09:38 Dose:  12.5 mg


Quetiapine Fumarate (Seroquel -)  12.5 mg PO Q12H [facility]


   Last Admin: 06/06/19 09:38 Dose:  12.5 mg


Sodium Chloride (Ocean Spray Nasal Spray -)  2 spray NS TID PRN


   PRN Reason: NASAL CONGESTION


Thiamine HCl (Vitamin B1 Injection -)  200 mg IVPB DAILY [facility]


   Last Admin: 06/06/19 09:40 Dose:  200 mg











- Objective


Vital Signs: 


 Vital Signs











Temperature  98 F   06/06/19 09:00


 


Pulse Rate  108 H  06/06/19 10:00


 


Respiratory Rate  20   06/06/19 10:00


 


Blood Pressure  126/85   06/06/19 10:00


 


O2 Sat by Pulse Oximetry (%)  98   06/06/19 09:00











Constitutional: Yes: Mild Distress


Eyes: Yes: WNL


HENT: Yes: WNL


Neck: Yes: WNL


Cardiovascular: Yes: Pulse Irregular


Respiratory: Yes: On Nasal O2, Rhonchi


Gastrointestinal: Yes: Soft


Genitourinary: Yes: Incontinence


Musculoskeletal: Yes: Muscle Weakness


Extremities: Yes: Other


Edema: Yes


Integumentary: Yes: WNL


Wound/Incision: Yes: Dressing Dry and Intact


Neurological: Yes: Pre-Existing Deficit


...Motor Strength: LLE, RLE


Psychiatric: Yes: Other


Labs: 


 CBC, BMP





 06/06/19 02:15 





 06/06/19 02:15 











Problem List





- Problems


(1) Toxic metabolic encephalopathy


Code(s): G92 - TOXIC ENCEPHALOPATHY   





(2) Altered mental status


Code(s): R41.82 - ALTERED MENTAL STATUS, UNSPECIFIED   





(3) THOMPSON (acute kidney injury)


Code(s): N17.9 - ACUTE KIDNEY FAILURE, UNSPECIFIED   





(4) COPD exacerbation


Code(s): J44.1 - CHRONIC OBSTRUCTIVE PULMONARY DISEASE W (ACUTE) EXACERBATION   





(5) Cough


Code(s): R05 - COUGH   





(6) HLD (hyperlipidemia)


Code(s): E78.5 - HYPERLIPIDEMIA, UNSPECIFIED   





(7) HTN (hypertension)


Code(s): I10 - ESSENTIAL (PRIMARY) HYPERTENSION   





(8) Prediabetes


Code(s): R73.03 - PREDIABETES   





Assessment/Plan





CT HEAD STAT R/O CVA


NEURO EVAL


AMS-TOXIC METABOLIC ENCEPHALOPATHY


IV ABX


NEBS


STEROIDS


PSYCH EVAL


AGREE WITH ZYPREXA


PULM WORK UP

## 2019-06-07 LAB
ANION GAP SERPL CALC-SCNC: 8 MMOL/L (ref 8–16)
ANISOCYTOSIS BLD QL: (no result)
APPEARANCE UR: CLEAR
BACTERIA #/AREA URNS HPF: 4.1 /HPF
BASOPHILS # BLD: 0.1 % (ref 0–2)
BILIRUB UR STRIP.AUTO-MCNC: NEGATIVE MG/DL
BUN SERPL-MCNC: 30 MG/DL (ref 7–18)
CALCIUM SERPL-MCNC: 9.2 MG/DL (ref 8.5–10.1)
CASTS #/AREA URNS LPF: 2 /LPF (ref 0–8)
CHLORIDE SERPL-SCNC: 101 MMOL/L (ref 98–107)
CO2 SERPL-SCNC: 28 MMOL/L (ref 21–32)
COLOR UR: YELLOW
CREAT SERPL-MCNC: 1 MG/DL (ref 0.55–1.3)
DEPRECATED RDW RBC AUTO: 13 % (ref 11.6–15.6)
EOSINOPHIL # BLD: 0.1 % (ref 0–4.5)
EPITH CASTS URNS QL MICRO: 1.1 /HPF
GLUCOSE SERPL-MCNC: 122 MG/DL (ref 74–106)
HCT VFR BLD CALC: 34.5 % (ref 32.4–45.2)
HGB BLD-MCNC: 11.6 GM/DL (ref 10.7–15.3)
KETONES UR QL STRIP: (no result)
LEUKOCYTE ESTERASE UR QL STRIP.AUTO: NEGATIVE
LYMPHOCYTES # BLD: 6.9 % (ref 8–40)
MACROCYTES BLD QL: 0
MAGNESIUM SERPL-MCNC: 2.3 MG/DL (ref 1.8–2.4)
MCH RBC QN AUTO: 32.1 PG (ref 25.7–33.7)
MCHC RBC AUTO-ENTMCNC: 33.6 G/DL (ref 32–36)
MCV RBC: 95.3 FL (ref 80–96)
MONOCYTES # BLD AUTO: 11 % (ref 3.8–10.2)
NEUTROPHILS # BLD: 81.9 % (ref 42.8–82.8)
NITRITE UR QL STRIP: NEGATIVE
PH UR: 6 [PH] (ref 5–8)
PHOSPHATE SERPL-MCNC: 2.4 MG/DL (ref 2.5–4.9)
PLATELET # BLD AUTO: 259 K/MM3 (ref 134–434)
PLATELET BLD QL SMEAR: NORMAL
PMV BLD: 7.4 FL (ref 7.5–11.1)
POTASSIUM SERPLBLD-SCNC: 3.9 MMOL/L (ref 3.5–5.1)
PROT UR QL STRIP: (no result)
PROT UR QL STRIP: (no result)
RBC # BLD AUTO: 3.62 M/MM3 (ref 3.6–5.2)
RBC # BLD AUTO: 9 /HPF (ref 0–4)
SODIUM SERPL-SCNC: 137 MMOL/L (ref 136–145)
SP GR UR: 1.02 (ref 1.01–1.03)
UROBILINOGEN UR STRIP-MCNC: 0.2 MG/DL (ref 0.2–1)
WBC # BLD AUTO: 12.8 K/MM3 (ref 4–10)
WBC # UR AUTO: 8 /HPF (ref 0–5)

## 2019-06-07 RX ADMIN — HEPARIN SODIUM SCH UNIT: 5000 INJECTION, SOLUTION INTRAVENOUS; SUBCUTANEOUS at 21:25

## 2019-06-07 RX ADMIN — LORATADINE SCH MG: 10 TABLET ORAL at 09:00

## 2019-06-07 RX ADMIN — ASPIRIN 81 MG SCH MG: 81 TABLET ORAL at 09:00

## 2019-06-07 RX ADMIN — NYSTATIN SCH UNIT: 500000 TABLET, FILM COATED ORAL at 21:26

## 2019-06-07 RX ADMIN — INSULIN ASPART SCH: 100 INJECTION, SOLUTION INTRAVENOUS; SUBCUTANEOUS at 21:25

## 2019-06-07 RX ADMIN — INSULIN ASPART SCH: 100 INJECTION, SOLUTION INTRAVENOUS; SUBCUTANEOUS at 06:15

## 2019-06-07 RX ADMIN — IPRATROPIUM BROMIDE AND ALBUTEROL SULFATE SCH AMP: .5; 3 SOLUTION RESPIRATORY (INHALATION) at 15:40

## 2019-06-07 RX ADMIN — CYANOCOBALAMIN SCH: 1000 INJECTION, SOLUTION INTRAMUSCULAR at 09:38

## 2019-06-07 RX ADMIN — POTASSIUM & SODIUM PHOSPHATES POWDER PACK 280-160-250 MG SCH PACKET: 280-160-250 PACK at 09:01

## 2019-06-07 RX ADMIN — METHIMAZOLE SCH MG: 5 TABLET ORAL at 21:27

## 2019-06-07 RX ADMIN — FOLIC ACID SCH MG: 1 TABLET ORAL at 09:00

## 2019-06-07 RX ADMIN — INSULIN ASPART SCH: 100 INJECTION, SOLUTION INTRAVENOUS; SUBCUTANEOUS at 16:22

## 2019-06-07 RX ADMIN — IPRATROPIUM BROMIDE AND ALBUTEROL SULFATE SCH AMP: .5; 3 SOLUTION RESPIRATORY (INHALATION) at 07:40

## 2019-06-07 RX ADMIN — FLUTICASONE PROPIONATE SCH SPRAY: 50 SPRAY, METERED NASAL at 21:23

## 2019-06-07 RX ADMIN — INSULIN ASPART SCH: 100 INJECTION, SOLUTION INTRAVENOUS; SUBCUTANEOUS at 11:31

## 2019-06-07 RX ADMIN — CEFTRIAXONE SCH MLS/HR: 1 INJECTION, POWDER, FOR SOLUTION INTRAMUSCULAR; INTRAVENOUS at 09:01

## 2019-06-07 RX ADMIN — THIAMINE HYDROCHLORIDE SCH MG: 100 INJECTION, SOLUTION INTRAMUSCULAR; INTRAVENOUS at 09:01

## 2019-06-07 RX ADMIN — INSULIN ASPART SCH: 100 INJECTION, SOLUTION INTRAVENOUS; SUBCUTANEOUS at 11:51

## 2019-06-07 RX ADMIN — HEPARIN SODIUM SCH UNIT: 5000 INJECTION, SOLUTION INTRAVENOUS; SUBCUTANEOUS at 09:01

## 2019-06-07 RX ADMIN — METHYLPREDNISOLONE SODIUM SUCCINATE SCH MG: 40 INJECTION, POWDER, FOR SOLUTION INTRAMUSCULAR; INTRAVENOUS at 09:01

## 2019-06-07 RX ADMIN — AZITHROMYCIN DIHYDRATE SCH MLS/HR: 500 INJECTION, POWDER, LYOPHILIZED, FOR SOLUTION INTRAVENOUS at 09:01

## 2019-06-07 RX ADMIN — ATORVASTATIN CALCIUM SCH MG: 40 TABLET, FILM COATED ORAL at 21:25

## 2019-06-07 RX ADMIN — CYANOCOBALAMIN SCH MCG: 1000 INJECTION, SOLUTION INTRAMUSCULAR at 09:01

## 2019-06-07 RX ADMIN — IPRATROPIUM BROMIDE AND ALBUTEROL SULFATE SCH AMP: .5; 3 SOLUTION RESPIRATORY (INHALATION) at 11:25

## 2019-06-07 RX ADMIN — FLUTICASONE PROPIONATE SCH: 50 SPRAY, METERED NASAL at 09:02

## 2019-06-07 RX ADMIN — IPRATROPIUM BROMIDE AND ALBUTEROL SULFATE SCH AMP: .5; 3 SOLUTION RESPIRATORY (INHALATION) at 20:40

## 2019-06-07 NOTE — PN
Progress Note (short form)





- Note


Progress Note: 





Renal follow up for THOMPSON





Pt seen and examined at the bedside


awake and alert but confused


denies any sob, or pain 





 Vital Signs











Temperature  98.6 F   06/07/19 13:52


 


Pulse Rate  119 H  06/07/19 13:52


 


Respiratory Rate  24 H  06/07/19 13:52


 


Blood Pressure  146/80   06/07/19 13:52


 


O2 Sat by Pulse Oximetry (%)  93 L  06/07/19 09:00








 Intake & Output











 06/04/19 06/05/19 06/06/19 06/07/19





 23:59 23:59 23:59 23:59


 


Intake Total 1865 1875 500 300


 


Balance 1865 1875 500 300


 


Weight  87.543 kg  











NAD


RRR, no M/R


Dec BS, slight wheeze


soft NT/ND


no LE edema


 


 CBC, BMP





 06/07/19 06:15 





 06/07/19 06:15 





 Current Medications





Acetaminophen (Tylenol -)  650 mg PO Q8H PRN


   PRN Reason: PAIN LEVEL 1-5


   Last Admin: 06/05/19 13:40 Dose:  650 mg


Albuterol Sulfate (Ventolin 0.083% Nebulizer Soln -)  1 amp NEB Q4H PRN


   PRN Reason: SHORT OF BREATH/WHEEZING


Albuterol/Ipratropium (Duoneb -)  1 amp NEB RQID [facility]


   Last Admin: 06/07/19 11:25 Dose:  1 amp


Aspirin (Asa -)  81 mg PO DAILY [facility]


   Last Admin: 06/07/19 09:00 Dose:  81 mg


Atorvastatin Calcium (Lipitor -)  40 mg PO HS [facility]


   Last Admin: 06/06/19 22:09 Dose:  40 mg


Benzocaine/Menthol (Cepacol Lozenge -)  1 each MM Q2H PRN


   PRN Reason: SORE THROAT


   Last Admin: 06/05/19 09:19 Dose:  1 each


Cyanocobalamin (Vitamin B12 Injection -)  1,000 mcg IM DAILY [facility]


   Stop: 06/11/19 10:01


   Last Admin: 06/07/19 09:38 Dose:  Not Given


Fluticasone Propionate (Flonase -)  1 spray NS BID [facility]


   Last Admin: 06/07/19 09:02 Dose:  Not Given


Folic Acid (Folic Acid -)  1 mg PO DAILY [facility]


   Last Admin: 06/07/19 09:00 Dose:  1 mg


Guaifenesin (Diabetic Tussin Dm -)  10 ml PO Q6H PRN


   PRN Reason: COUGH


   Last Admin: 06/06/19 17:14 Dose:  10 ml


Heparin Sodium (Porcine) (Heparin -)  5,000 unit SQ BID [facility]


   Last Admin: 06/07/19 09:01 Dose:  5,000 unit


Ceftriaxone Sodium 1 gm/ (Dextrose)  50 mls @ 200 mls/hr IVPB DAILY [facility]; 

Protocol


   Last Admin: 06/07/19 09:01 Dose:  200 mls/hr


Azithromycin (Zithromax 500mg Ivpb (Pre-Docked))  500 mg in 250 mls @ 250 mls/

hr IVPB DAILY [facility]


   Stop: 06/08/19 10:59


   Last Admin: 06/07/19 09:01 Dose:  250 mls/hr


Insulin Aspart (Novolog Vial Sliding Scale -)  1 vial SQ ACHS [facility]; Protocol


   Last Admin: 06/07/19 11:51 Dose:  Not Given


Loratadine (Claritin -)  10 mg PO DAILY [facility]


   Last Admin: 06/07/19 09:00 Dose:  10 mg


Methylprednisolone Sodium Succinate (Solu-Medrol -)  40 mg IVPUSH DAILY [facility]


   Last Admin: 06/07/19 09:01 Dose:  40 mg


Metoprolol Succinate (Toprol Xl -)  12.5 mg PO DAILY [facility]


   Last Admin: 06/07/19 09:00 Dose:  12.5 mg


Nystatin (Nystatin)  500,000 unit PO TID [facility]


Olanzapine (Zyprexa -)  5 mg PO HS [facility]


   Last Admin: 06/06/19 22:08 Dose:  5 mg


Potassium Phos/Sodium Phos (Phos-Nak Packet -)  1 packet PO DAILY [facility]


   Last Admin: 06/07/19 09:01 Dose:  1 packet


Sodium Chloride (Ocean Spray Nasal Spray -)  2 spray NS TID PRN


   PRN Reason: NASAL CONGESTION


Thiamine HCl (Vitamin B1 Injection -)  200 mg IVPB DAILY [facility]


   Last Admin: 06/07/19 09:01 Dose:  200 mg

















74 year old  woman with hx of COPD, Arthritis, Nephrolithiasis who 

presented with 4 day history of cough and sob and admitted for COPD 

exacerbation with bronchitis and Cr of 1.7.





#THOMPSON due to hypovolemic in setting of acute infection 


#COPD exacerbation


#Bronchitis


#Hyperkalemia, now resolved


#Hyperlipidemia


#Delirium 





Renal function improved and stable off IVF


cause of delirium unclear, may be steroid related


UA and urine culture negative from 6/4


Steroids being tapered by pulmonary


continue work up for deliirum per Neurology and Psych





will sign off case at this time, please call if there is a change in clinical 

status or if any questions 





thank you 


Salazar Godinez DO

## 2019-06-07 NOTE — PN
Progress Note, Physician


Chief Complaint: 





Acute Hypoxic Respiratory Failure


COPD exacerbation 


AMS


History of Present Illness: 





Previous notes and events reviewed


awake and alert


NAD


pending Brain MRI








- Current Medication List


Current Medications: 


Active Medications





Acetaminophen (Tylenol -)  650 mg PO Q8H PRN


   PRN Reason: PAIN LEVEL 1-5


   Last Admin: 06/05/19 13:40 Dose:  650 mg


Albuterol Sulfate (Ventolin 0.083% Nebulizer Soln -)  1 amp NEB Q4H PRN


   PRN Reason: SHORT OF BREATH/WHEEZING


Albuterol/Ipratropium (Duoneb -)  1 amp NEB RQID [facility]


   Last Admin: 06/07/19 11:25 Dose:  1 amp


Aspirin (Asa -)  81 mg PO DAILY [facility]


   Last Admin: 06/07/19 09:00 Dose:  81 mg


Atorvastatin Calcium (Lipitor -)  40 mg PO HS [facility]


   Last Admin: 06/06/19 22:09 Dose:  40 mg


Benzocaine/Menthol (Cepacol Lozenge -)  1 each MM Q2H PRN


   PRN Reason: SORE THROAT


   Last Admin: 06/05/19 09:19 Dose:  1 each


Cyanocobalamin (Vitamin B12 Injection -)  1,000 mcg IM DAILY [facility]


   Stop: 06/11/19 10:01


   Last Admin: 06/07/19 09:38 Dose:  Not Given


Fluticasone Propionate (Flonase -)  1 spray NS BID [facility]


   Last Admin: 06/07/19 09:02 Dose:  Not Given


Folic Acid (Folic Acid -)  1 mg PO DAILY [facility]


   Last Admin: 06/07/19 09:00 Dose:  1 mg


Guaifenesin (Diabetic Tussin Dm -)  10 ml PO Q6H PRN


   PRN Reason: COUGH


   Last Admin: 06/06/19 17:14 Dose:  10 ml


Heparin Sodium (Porcine) (Heparin -)  5,000 unit SQ BID [facility]


   Last Admin: 06/07/19 09:01 Dose:  5,000 unit


Ceftriaxone Sodium 1 gm/ (Dextrose)  50 mls @ 200 mls/hr IVPB DAILY [facility]; 

Protocol


   Last Admin: 06/07/19 09:01 Dose:  200 mls/hr


Azithromycin (Zithromax 500mg Ivpb (Pre-Docked))  500 mg in 250 mls @ 250 mls/

hr IVPB DAILY [facility]


   Stop: 06/08/19 10:59


   Last Admin: 06/07/19 09:01 Dose:  250 mls/hr


Insulin Aspart (Novolog Vial Sliding Scale -)  1 vial SQ ACHS [facility]; Protocol


   Last Admin: 06/07/19 11:51 Dose:  Not Given


Loratadine (Claritin -)  10 mg PO DAILY [facility]


   Last Admin: 06/07/19 09:00 Dose:  10 mg


Methylprednisolone Sodium Succinate (Solu-Medrol -)  40 mg IVPUSH DAILY [facility]


   Last Admin: 06/07/19 09:01 Dose:  40 mg


Metoprolol Succinate (Toprol Xl -)  12.5 mg PO DAILY [facility]


   Last Admin: 06/07/19 09:00 Dose:  12.5 mg


Olanzapine (Zyprexa -)  5 mg PO HS [facility]


   Last Admin: 06/06/19 22:08 Dose:  5 mg


Potassium Phos/Sodium Phos (Phos-Nak Packet -)  1 packet PO DAILY [facility]


   Last Admin: 06/07/19 09:01 Dose:  1 packet


Sodium Chloride (Ocean Spray Nasal Spray -)  2 spray NS TID PRN


   PRN Reason: NASAL CONGESTION


Thiamine HCl (Vitamin B1 Injection -)  200 mg IVPB DAILY [facility]


   Last Admin: 06/07/19 09:01 Dose:  200 mg











- Objective


Vital Signs: 


 Vital Signs











Temperature  98.6 F   06/07/19 13:52


 


Pulse Rate  119 H  06/07/19 13:52


 


Respiratory Rate  24 H  06/07/19 13:52


 


Blood Pressure  146/80   06/07/19 13:52


 


O2 Sat by Pulse Oximetry (%)  93 L  06/07/19 09:00











Constitutional: Yes: No Distress, Calm


Eyes: Yes: Conjunctiva Clear


HENT: Yes: Atraumatic


Cardiovascular: Yes: Regular Rate and Rhythm


Respiratory: Yes: Wheezes


Gastrointestinal: Yes: Normal Bowel Sounds, Soft, Abdomen, Obese


Genitourinary: Yes: Incontinence


Musculoskeletal: Yes: Muscle Weakness


Extremities: Yes: WNL


Edema: No


Neurological: Yes: Alert


Psychiatric: Yes: Alert, Oriented (name, place, time)


Labs: 


 CBC, BMP





 06/07/19 06:15 





 06/07/19 06:15 





 Microbiology





06/03/19 07:00   Urine - Urine Clean Catch   Urine Culture - Final


                            NO GROWTH OBTAINED











- ....Imaging


Chest X-ray: Report Reviewed





Problem List





- Problems


(1) THOMPSON (acute kidney injury)


Assessment/Plan: 


-renal on board


-BUN/Cr 30/1.0


-monitor renal function daily


Code(s): N17.9 - ACUTE KIDNEY FAILURE, UNSPECIFIED   





(2) COPD exacerbation


Assessment/Plan: 


-Pulm on board


-IV medrol


-bronchodilators


-Azithromycin


-keep SpO2 >90%


-O2 via NC


-CXR shows no acute pathology


Code(s): J44.1 - CHRONIC OBSTRUCTIVE PULMONARY DISEASE W (ACUTE) EXACERBATION   





(3) HLD (hyperlipidemia)


Assessment/Plan: 


-Atorvastatin


Code(s): E78.5 - HYPERLIPIDEMIA, UNSPECIFIED   





(4) HTN (hypertension)


Assessment/Plan: 


-Metoprolol


Code(s): I10 - ESSENTIAL (PRIMARY) HYPERTENSION   





(5) Toxic metabolic encephalopathy


Assessment/Plan: 


-2/2 Steroid us?


-pending Brain MRI


-neurology on board


-Ceftriaxone


-UC neg


-Psych on board


-Zyprexa HS


Code(s): G92 - TOXIC ENCEPHALOPATHY   





Assessment/Plan





see problem list


dvt ppx

## 2019-06-07 NOTE — PN
Progress Note (short form)





- Note


Progress Note: 








NEUROLOGY PROGRESS:





Events reviewed and discussed with staff and 1:1.


Remained delirious through the night and today in spite of Zyprexa 5 mg and 

Haldo 2 mg yesterday.


Received additional IM Haldol today and will get Zypexa 10 mg at 9 PM as per 

Dr. Gongora.





MRI of brain (read by me but not yet reported): Minimal atrophy. Chronic left 

internal capsule lacunar infarct. Chronic sphenoid sinusitis with air fluid 

levels.





EXAM: Neck supple. Neg Kernigs. Afebrile


         Still with deep cough


         Resting with eyes closed. Talking nonsense to herself.


         Follows commands. Ox [place]


         Full jackson and EOM's


         No drift. Min cogwheeling


         Non-focal exam





IMP: Mild underlying OMS with Psychotic features 


       Now exacerbated by infection and steroid Rx. (TME)





Suggest: Taper steroids as expeditiously as possible


             Gradually increase Zyprexa as necessary and tolerated


             Continue Thiamine 200-250 mg IVPB q 8 hrs x 48 hours.





Thank you very much,


Quang Garcia MD

## 2019-06-07 NOTE — CONSULT
Consult


Consult Specialty:: endocrine


Reason for Consultation:: yady daviesp





- History of Present Illness


Chief Complaint: anxious and restless


History of Present Illness: 


74 year old female with a PMHx DM2,COPD, OA, , HTN, HLD presents with wheezing, 

progressively worsening KUHN, and cough. She went to her PCP  and was given 

Zyrtec with no relief.. Today she had significant KUHN walking to the car so she 

came to ED for further evaluation. She denies fever/chills, n/v/d. Has had 

anxiety and restless associated with tremors,found to have hyperthyroidism.








- Past Medical History


Cardio/Vascular: Yes: HTN, Hyperlipdemia


Pulmonary: Yes: COPD


Musculoskeletal: Yes: Osteoarthritis


ENT: Yes: Allergic Rhinitis


Endocrine: Yes: Diabetes Mellitus (Prediabetes)





- Past Surgical History


Past Surgical History: Yes: Joint Replacement (right hip replacement, left arm 

surgery)





- Alcohol/Substance Use


Hx Alcohol Use: No


History of Substance Use: reports: None





- Smoking History


Smoking history: Former smoker (smoked over 1 ppd for over 40 years, quit 6 

years ago)


Have you smoked in the past 12 months: No


Aproximately how many cigarettes per day: 0


If you are a former smoker, when did you quit?: 2012





- Social History


Usual Living Arrangement: Alone


ADL: Independent


History of Recent Travel: No





Home Medications





- Allergies


Allergies/Adverse Reactions: 


 Allergies











Allergy/AdvReac Type Severity Reaction Status Date / Time


 


No Known Drug Allergies Allergy   Verified 06/03/19 20:56














- Home Medications


Home Medications: 


Ambulatory Orders





Atorvastatin Ca [Lipitor] 40 mg PO HS 06/28/13 


Cholecalciferol (Vitamin D3) [Vitamin D3] 1,000 unit PO DAILY 12/04/15 


Cyanocobalamin [Vitamin B12 -] 1,000 mcg PO DAILY 12/04/15 


Omega-3 Fatty Acids [Fish Oil] 300 mg PO DAILY 12/04/15 


Aspirin [ASA -] 81 mg PO DAILY 11/14/16 


Metoprolol Succinate [Toprol XL -] 12.5 mg PO DAILY 11/14/16 











Family Disease History





- Family Disease History


Family Disease History: Other: Father (htn), Mother (htn)





Review of Systems





- Review of Systems


Constitutional: reports: Weakness


HENT: reports: No Symptoms


Neck: reports: No Symptoms


Cardiovascular: reports: Shortness of Breath


Respiratory: reports: Exercise Intolerance


Gastrointestinal: reports: Bloating


Genitourinary: reports: No Symptoms, Frequency


Breasts: reports: No Symptoms Reported


Musculoskeletal: reports: Muscle Pain, Muscle Cramps, Muscle Weakness


Neurological: reports: Numbness, Weakness


Endocrine: reports: Unexplained Weight Loss





Physical Exam


Vital Signs: 


 Vital Signs











Temperature  98.1 F   06/07/19 18:27


 


Pulse Rate  117 H  06/07/19 18:27


 


Respiratory Rate  22 H  06/07/19 18:27


 


Blood Pressure  159/93   06/07/19 18:27


 


O2 Sat by Pulse Oximetry (%)  93 L  06/07/19 09:00











Constitutional: Yes: Anxious


Eyes: Yes: EOM Intact


HENT: Yes: Normocephalic


Neck: Yes: Trachea Midline


Cardiovascular: Yes: Tachycardia


Respiratory: Yes: Rhonchi, SOB, SOB on Exertion


Gastrointestinal: Yes: Normal Bowel Sounds


...Rectal Exam: Yes: Deferred


Musculoskeletal: Yes: Muscle Pain, Muscle Weakness


Edema: No


Neurological: Yes: Alert, Tremors


Psychiatric: Yes: Agitated


Labs: 


 CBC, BMP





 06/07/19 06:15 





 06/07/19 06:15 











Problem List





- Problems


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


Code(s): E05.00 - THYROTOXICOSIS W DIFFUSE GOITER W/O THYROTOXIC CRISIS   





(2) Altered mental status


Code(s): R41.82 - ALTERED MENTAL STATUS, UNSPECIFIED   





(3) HLD (hyperlipidemia)


Code(s): E78.5 - HYPERLIPIDEMIA, UNSPECIFIED   





(4) HTN (hypertension)


Code(s): I10 - ESSENTIAL (PRIMARY) HYPERTENSION   





(5) Prediabetes


Code(s): R73.03 - PREDIABETES   





Assessment/Plan





Current Active Problems


hyperthyroidism 


THOMPSON (acute kidney injury) (Acute)


Altered mental status (Acute)


COPD exacerbation (Acute)


Cough (Acute)


HLD (hyperlipidemia) (Acute)


HTN (hypertension) (Acute)


Prediabetes (Acute)


Toxic metabolic encephalopathy (Acute)


 Abnormal Lab Results











  06/07/19 06/07/19





  06:15 06:15


 


WBC  12.8 H 


 


MPV  7.4 L 


 


Absolute Neuts (auto)  10.5 H 


 


Neutrophils % (Manual)  90.0 H 


 


Lymphocytes %  6.9 L D 


 


Lymphocytes % (Manual)  6.0 L D 


 


Monocytes %  11.0 H D 


 


Monocytes % (Manual)  3 L 


 


BUN   30 H


 


Random Glucose   122 H


 


Phosphorus   2.4 L








 Laboratory Results - last 24 hr











  06/06/19 06/07/19 06/07/19





  22:07 06:13 06:15


 


WBC    12.8 H


 


RBC    3.62


 


Hgb    11.6


 


Hct    34.5


 


MCV    95.3


 


MCH    32.1


 


MCHC    33.6


 


RDW    13.0


 


Plt Count    259


 


MPV    7.4 L


 


Absolute Neuts (auto)    10.5 H


 


Neutrophils %    81.9


 


Neutrophils % (Manual)    90.0 H


 


Band Neutrophils %    0.0


 


Lymphocytes %    6.9 L D


 


Lymphocytes % (Manual)    6.0 L D


 


Monocytes %    11.0 H D


 


Monocytes % (Manual)    3 L


 


Eosinophils %    0.1  D


 


Eosinophils % (Manual)    0.0


 


Basophils %    0.1


 


Basophils % (Manual)    0.0


 


Myelocytes % (Man)    0


 


Promyelocytes % (Man)    0


 


Blast Cells % (Manual)    0


 


Nucleated RBC %    0


 


Metamyelocytes    0


 


Hypochromia    0


 


Platelet Estimate    Normal


 


Polychromasia    0


 


Poikilocytosis    2+


 


Anisocytosis    2+


 


Microcytosis    2+


 


Macrocytosis    0


 


Sodium   


 


Potassium   


 


Chloride   


 


Carbon Dioxide   


 


Anion Gap   


 


BUN   


 


Creatinine   


 


Est GFR (CKD-EPI)AfAm   


 


Est GFR (CKD-EPI)NonAf   


 


POC Glucometer  131  120 


 


Random Glucose   


 


Calcium   


 


Phosphorus   


 


Magnesium   














  06/07/19 06/07/19 06/07/19





  06:15 11:36 16:21


 


WBC   


 


RBC   


 


Hgb   


 


Hct   


 


MCV   


 


MCH   


 


MCHC   


 


RDW   


 


Plt Count   


 


MPV   


 


Absolute Neuts (auto)   


 


Neutrophils %   


 


Neutrophils % (Manual)   


 


Band Neutrophils %   


 


Lymphocytes %   


 


Lymphocytes % (Manual)   


 


Monocytes %   


 


Monocytes % (Manual)   


 


Eosinophils %   


 


Eosinophils % (Manual)   


 


Basophils %   


 


Basophils % (Manual)   


 


Myelocytes % (Man)   


 


Promyelocytes % (Man)   


 


Blast Cells % (Manual)   


 


Nucleated RBC %   


 


Metamyelocytes   


 


Hypochromia   


 


Platelet Estimate   


 


Polychromasia   


 


Poikilocytosis   


 


Anisocytosis   


 


Microcytosis   


 


Macrocytosis   


 


Sodium  137  


 


Potassium  3.9  


 


Chloride  101  


 


Carbon Dioxide  28  


 


Anion Gap  8  


 


BUN  30 H  


 


Creatinine  1.0  


 


Est GFR (CKD-EPI)AfAm  64.73  


 


Est GFR (CKD-EPI)NonAf  55.85  


 


POC Glucometer   192  159


 


Random Glucose  122 H  


 


Calcium  9.2  


 


Phosphorus  2.4 L  


 


Magnesium  2.3  














 Laboratory Tests











  06/06/19 06/06/19





  02:15 15:24


 


TSH  0.08 L  0.07 L


 


Free T4   1.57 H





plan:


tapazole 5mg bid


normalize tsh

## 2019-06-07 NOTE — PN
Progress Note, SLP





- Note


Progress Note: 





 Selected Entries











  06/06/19 06/06/19 06/06/19





  09:00 10:26 15:27


 


Breakfast  25% 


 


Lunch   50%


 


Supper   


 


Temperature 98 F  98.3 F














  06/06/19 06/06/19 06/06/19





  18:00 18:07 22:00


 


Breakfast   


 


Lunch   


 


Supper  75% 


 


Temperature 97.6 F  98.2 F














  06/07/19 06/07/19





  05:57 10:00


 


Breakfast  


 


Lunch  


 


Supper  


 


Temperature 98.2 F 97.6 F








 Laboratory Tests











  06/05/19 06/06/19 06/07/19





  08:30 02:15 06:15


 


WBC  14.4 H  14.2 H  12.8 H








Pt on reg diet/thin liquid. Tongue still coated. r/o thrush


Family present. Not eating solids food at bedside.





REC: Dys puree/thin liquids/Glucerna/Magic cup 


r/o thrush/treat as indicated.

## 2019-06-07 NOTE — PN
Progress Note (short form)





- Note


Progress Note: 





Staff report that patient became extremly agitated and hallucinated and began 

going in and out of her room. She was given haldol injection. Psychosis does 

not seem to caty with 5mg of Zyprexa.


Plan: increase Zyprexa 10 mg po hs.


@) continue with 1:1 for safety.

## 2019-06-07 NOTE — PN
Progress Note (short form)





- Note


Progress Note: 





PULMONARY





APPEARS RESTLESS


VSS/AFEBRILE





AGITATION/CONFUSION PREDATED THE IV STEROIDS


STARTED PRIOR TO ADMISSION





PALE/ANICTERIC


MILD DIFFUSE EXP WHEEZE ANT/POST


S1S2


BS+ OBESE


1+ EDEMA LOWER EXT





LABS/MEDS/NOTES/IMAGES/ABG / PSYCH EVAL REVIEWED








A/P


Acute COPD Exacerbation


Acute Bronchitis/Sinusitis


HTN


Hyperlipidemia


Nonspecific Lung Nodules





-  medrol decreased to help minimize causes of delirium


-  inhaled bronchodilators standing and PRN


-  continue antibiotics


-  f/u cultures


-  o2 to keep SpO2 >90%


-  outpt f/u of lung nodules


-  DVT prophylaxis





TERRELL PICKARD MD

## 2019-06-07 NOTE — HOL
Hook-up date: 2019-06-05   16:03:00       Duration: 11:15:00

Test Indications: MONITOR TACHYCARDIA

Medications: 

 

 

 

 

 

 

 

 89457 QRS complexes

    24 Ventricular      ectopics which represent    <1 % of total QRS comp.

    21 Supraventricular ectopics which represent    <1 % of total QRS comp.

     * Paced QRS complexs        which represent  **** % of total QRS comp.

     * % of Time Classified as Noise

VENTRICULAR ECTOPY

    24 Isolated

     0 Bigeminal Cycles

     0 Couplets

     0 Runs

     0 Beats in Runs

     * Beats LONGEST at   *  BPM at **:**:** ****-**-**

     * Beats FASTEST at   *  BPM at **:**:** ****-**-**

SUPRAVENTRICULAR ECTOPY

    21 Isolated

     0 Couplets

     0 Runs

     0 Beats in Runs

     * Beats LONGEST at   *  BPM at **:**:** ****-**-**

     * Beats FASTEST at   *  BPM at **:**:** ****-**-**

HEART RATES

    88 MIN at 20:03:37 2019-06-05

   108 AVG

   138 MAX at 02:56:19 2019-06-06

LONGEST RR

     0.680 secs at 00:13:03 2019-06-06   

SCANNED BY ANTOLIN GARDUNO ON 6/7/19

 RUN 11:15 HOURS..ORDERING DOCTOR INFORMED.

 

The underlying rhythm was normal sinus/sinus tachycardia at an average

rate of 108bpm.

There were rare, single ventricular premature contractions.

There were rare, single atrial premature contractions.

No significant pauses or sustained arrhythmias. 

 

 

Confirmed by GITA KEITH, SYDNIE ([id number]) on 6/7/2019 2:21:19 PM

Referred By: TED ORONA DR           Overread By: SYDNIE PELAYO MD

## 2019-06-08 LAB
ALBUMIN SERPL-MCNC: 3.3 G/DL (ref 3.4–5)
ALP SERPL-CCNC: 69 U/L (ref 45–117)
ALT SERPL-CCNC: 52 U/L (ref 13–61)
ANION GAP SERPL CALC-SCNC: 8 MMOL/L (ref 8–16)
ANISOCYTOSIS BLD QL: (no result)
AST SERPL-CCNC: 43 U/L (ref 15–37)
BASOPHILS # BLD: 0.1 % (ref 0–2)
BILIRUB SERPL-MCNC: 0.5 MG/DL (ref 0.2–1)
BUN SERPL-MCNC: 24.9 MG/DL (ref 7–18)
CALCIUM SERPL-MCNC: 9 MG/DL (ref 8.5–10.1)
CHLORIDE SERPL-SCNC: 96 MMOL/L (ref 98–107)
CO2 SERPL-SCNC: 31 MMOL/L (ref 21–32)
CREAT SERPL-MCNC: 0.9 MG/DL (ref 0.55–1.3)
DEPRECATED RDW RBC AUTO: 12.7 % (ref 11.6–15.6)
EOSINOPHIL # BLD: 0 % (ref 0–4.5)
GLUCOSE SERPL-MCNC: 178 MG/DL (ref 74–106)
HCT VFR BLD CALC: 35.1 % (ref 32.4–45.2)
HGB BLD-MCNC: 11.9 GM/DL (ref 10.7–15.3)
LYMPHOCYTES # BLD: 6.5 % (ref 8–40)
MACROCYTES BLD QL: (no result)
MCH RBC QN AUTO: 32.3 PG (ref 25.7–33.7)
MCHC RBC AUTO-ENTMCNC: 33.7 G/DL (ref 32–36)
MCV RBC: 95.8 FL (ref 80–96)
MONOCYTES # BLD AUTO: 10 % (ref 3.8–10.2)
NEUTROPHILS # BLD: 83.4 % (ref 42.8–82.8)
PLATELET # BLD AUTO: 445 K/MM3 (ref 134–434)
PLATELET BLD QL SMEAR: NORMAL
PMV BLD: 7.3 FL (ref 7.5–11.1)
POTASSIUM SERPLBLD-SCNC: 3.5 MMOL/L (ref 3.5–5.1)
PROT SERPL-MCNC: 6.2 G/DL (ref 6.4–8.2)
RBC # BLD AUTO: 3.67 M/MM3 (ref 3.6–5.2)
SODIUM SERPL-SCNC: 135 MMOL/L (ref 136–145)
WBC # BLD AUTO: 11.9 K/MM3 (ref 4–10)

## 2019-06-08 RX ADMIN — NYSTATIN SCH: 500000 TABLET, FILM COATED ORAL at 06:43

## 2019-06-08 RX ADMIN — AZITHROMYCIN DIHYDRATE SCH MLS/HR: 500 INJECTION, POWDER, LYOPHILIZED, FOR SOLUTION INTRAVENOUS at 09:51

## 2019-06-08 RX ADMIN — FLUTICASONE PROPIONATE SCH: 50 SPRAY, METERED NASAL at 22:18

## 2019-06-08 RX ADMIN — NYSTATIN SCH UNIT: 500000 TABLET, FILM COATED ORAL at 14:30

## 2019-06-08 RX ADMIN — HEPARIN SODIUM SCH UNIT: 5000 INJECTION, SOLUTION INTRAVENOUS; SUBCUTANEOUS at 22:18

## 2019-06-08 RX ADMIN — FOLIC ACID SCH MG: 1 TABLET ORAL at 09:12

## 2019-06-08 RX ADMIN — POTASSIUM & SODIUM PHOSPHATES POWDER PACK 280-160-250 MG SCH PACKET: 280-160-250 PACK at 09:11

## 2019-06-08 RX ADMIN — NYSTATIN SCH UNIT: 500000 TABLET, FILM COATED ORAL at 22:18

## 2019-06-08 RX ADMIN — INSULIN ASPART SCH UNITS: 100 INJECTION, SOLUTION INTRAVENOUS; SUBCUTANEOUS at 11:24

## 2019-06-08 RX ADMIN — METHIMAZOLE SCH MG: 5 TABLET ORAL at 22:18

## 2019-06-08 RX ADMIN — IPRATROPIUM BROMIDE AND ALBUTEROL SULFATE SCH AMP: .5; 3 SOLUTION RESPIRATORY (INHALATION) at 09:30

## 2019-06-08 RX ADMIN — GUAIFENESIN AND DEXTROMETHORPHAN HYDROBROMIDE PRN ML: 100; 10 SOLUTION ORAL at 14:30

## 2019-06-08 RX ADMIN — METHYLPREDNISOLONE SODIUM SUCCINATE SCH MG: 40 INJECTION, POWDER, FOR SOLUTION INTRAMUSCULAR; INTRAVENOUS at 09:11

## 2019-06-08 RX ADMIN — ATORVASTATIN CALCIUM SCH MG: 40 TABLET, FILM COATED ORAL at 22:18

## 2019-06-08 RX ADMIN — CYANOCOBALAMIN SCH MCG: 1000 INJECTION, SOLUTION INTRAMUSCULAR at 09:14

## 2019-06-08 RX ADMIN — CEFTRIAXONE SCH MLS/HR: 1 INJECTION, POWDER, FOR SOLUTION INTRAMUSCULAR; INTRAVENOUS at 09:11

## 2019-06-08 RX ADMIN — IPRATROPIUM BROMIDE AND ALBUTEROL SULFATE SCH AMP: .5; 3 SOLUTION RESPIRATORY (INHALATION) at 21:30

## 2019-06-08 RX ADMIN — LORATADINE SCH MG: 10 TABLET ORAL at 09:13

## 2019-06-08 RX ADMIN — FLUTICASONE PROPIONATE SCH: 50 SPRAY, METERED NASAL at 09:54

## 2019-06-08 RX ADMIN — ASPIRIN 81 MG SCH MG: 81 TABLET ORAL at 09:11

## 2019-06-08 RX ADMIN — HEPARIN SODIUM SCH UNIT: 5000 INJECTION, SOLUTION INTRAVENOUS; SUBCUTANEOUS at 09:12

## 2019-06-08 RX ADMIN — IPRATROPIUM BROMIDE AND ALBUTEROL SULFATE SCH AMP: .5; 3 SOLUTION RESPIRATORY (INHALATION) at 16:00

## 2019-06-08 RX ADMIN — METHIMAZOLE SCH MG: 5 TABLET ORAL at 09:13

## 2019-06-08 RX ADMIN — INSULIN ASPART SCH UNITS: 100 INJECTION, SOLUTION INTRAVENOUS; SUBCUTANEOUS at 17:10

## 2019-06-08 RX ADMIN — IPRATROPIUM BROMIDE AND ALBUTEROL SULFATE SCH AMP: .5; 3 SOLUTION RESPIRATORY (INHALATION) at 12:35

## 2019-06-08 RX ADMIN — INSULIN ASPART SCH: 100 INJECTION, SOLUTION INTRAVENOUS; SUBCUTANEOUS at 22:17

## 2019-06-08 RX ADMIN — THIAMINE HYDROCHLORIDE SCH MG: 100 INJECTION, SOLUTION INTRAMUSCULAR; INTRAVENOUS at 11:13

## 2019-06-08 RX ADMIN — INSULIN ASPART SCH: 100 INJECTION, SOLUTION INTRAVENOUS; SUBCUTANEOUS at 06:43

## 2019-06-08 NOTE — PN
Progress Note (short form)





- Note


Progress Note: 





PULMONARY





Still confused. States breathing slightly better.





 Vital Signs











 Period  Temp  Pulse  Resp  BP Sys/Craft  Pulse Ox


 


 Last 24 Hr  98 F-99.0 F  106-119  21-24  113-159/75-93  94-95











Gen:  less tachypneic with speaking


Heart: RRR


Lung: decreased breath sounds at the bases, no wheezes


Abd: soft, nontender


Ext: no edema





 CBC, BMP





 06/08/19 10:10 





 06/08/19 10:10 





Active Medications





Acetaminophen (Tylenol -)  650 mg PO Q8H PRN


   PRN Reason: PAIN LEVEL 1-5


   Last Admin: 06/05/19 13:40 Dose:  650 mg


Albuterol Sulfate (Ventolin 0.083% Nebulizer Soln -)  1 amp NEB Q4H PRN


   PRN Reason: SHORT OF BREATH/WHEEZING


Albuterol/Ipratropium (Duoneb -)  1 amp NEB RQID [facility]


   Last Admin: 06/08/19 09:30 Dose:  1 amp


Aspirin (Asa -)  81 mg PO DAILY [facility]


   Last Admin: 06/08/19 09:11 Dose:  81 mg


Atorvastatin Calcium (Lipitor -)  40 mg PO HS [facility]


   Last Admin: 06/07/19 21:25 Dose:  40 mg


Benzocaine/Menthol (Cepacol Lozenge -)  1 each MM Q2H PRN


   PRN Reason: SORE THROAT


   Last Admin: 06/05/19 09:19 Dose:  1 each


Cyanocobalamin (Vitamin B12 Injection -)  1,000 mcg IM DAILY [facility]


   Stop: 06/11/19 10:01


   Last Admin: 06/08/19 09:14 Dose:  1,000 mcg


Fluticasone Propionate (Flonase -)  1 spray NS BID [facility]


   Last Admin: 06/08/19 09:54 Dose:  Not Given


Folic Acid (Folic Acid -)  1 mg PO DAILY [facility]


   Last Admin: 06/08/19 09:12 Dose:  1 mg


Guaifenesin (Diabetic Tussin Dm -)  10 ml PO Q6H PRN


   PRN Reason: COUGH


   Last Admin: 06/06/19 17:14 Dose:  10 ml


Heparin Sodium (Porcine) (Heparin -)  5,000 unit SQ BID [facility]


   Last Admin: 06/08/19 09:12 Dose:  5,000 unit


Ceftriaxone Sodium 1 gm/ (Dextrose)  50 mls @ 200 mls/hr IVPB DAILY [facility]; 

Protocol


   Last Admin: 06/08/19 09:11 Dose:  200 mls/hr


Insulin Aspart (Novolog Vial Sliding Scale -)  1 vial SQ ACHS [facility]; Protocol


   Last Admin: 06/08/19 11:24 Dose:  4 units


Loratadine (Claritin -)  10 mg PO DAILY [facility]


   Last Admin: 06/08/19 09:13 Dose:  10 mg


Methimazole (Tapazole -)  5 mg PO BID [facility]


   Last Admin: 06/08/19 09:13 Dose:  5 mg


Methylprednisolone Sodium Succinate (Solu-Medrol -)  40 mg IVPUSH DAILY [facility]


   Last Admin: 06/08/19 09:11 Dose:  40 mg


Metoprolol Succinate (Toprol Xl -)  12.5 mg PO DAILY [facility]


   Last Admin: 06/08/19 09:12 Dose:  12.5 mg


Nystatin (Nystatin)  500,000 unit PO TID [facility]


   Last Admin: 06/08/19 06:43 Dose:  Not Given


Olanzapine (Zyprexa -)  10 mg PO HS [facility]


   Last Admin: 06/07/19 21:27 Dose:  10 mg


Potassium Phos/Sodium Phos (Phos-Nak Packet -)  1 packet PO DAILY [facility]


   Last Admin: 06/08/19 09:11 Dose:  1 packet


Sodium Chloride (Ocean Spray Nasal Spray -)  2 spray NS TID PRN


   PRN Reason: NASAL CONGESTION


Thiamine HCl (Vitamin B1 Injection -)  200 mg IVPB DAILY [facility]


   Last Admin: 06/08/19 11:13 Dose:  200 mg











A/P


Acute COPD Exacerbation


Acute Bronchitis/Sinusitis


HTN


Hyperlipidemia


Nonspecific Lung Nodules





-  will d/c steroids


-  inhaled bronchodilators standing and PRN


-  continue antibiotics


-  o2 to keep SpO2 >90%


-  outpt f/u of lung nodules


-  DVT prophylaxis








Problem List





- Problems


(1) COPD exacerbation


Code(s): J44.1 - CHRONIC OBSTRUCTIVE PULMONARY DISEASE W (ACUTE) EXACERBATION

## 2019-06-08 NOTE — HOSP
Subjective





- Review of Symptoms


Events since last encounter: 





Hospitalist Encounter


Notified by RN that the patient is febrile and tachycardic.


Subjective: 





Arrived to bedside, patient is awake, alert to name only- current baseline. PE 

performed see EMR








Assessment:


This is a 75 y/o woman with a PMHx of COPD, OA, Pre Diabetes, HTN, HLD. 

Admitted for Acute Hypoxic Respiratory Failure secondary to COPD Exacerbation.





Plan


EKG


Rectal Temp


Continue to monitor








Physical Examination


Vital Signs: 


 Vital Signs











Temperature  99.9 F H  06/08/19 22:00


 


Pulse Rate  126 H  06/08/19 22:00


 


Respiratory Rate  24 H  06/08/19 22:00


 


Blood Pressure  161/80   06/08/19 22:00


 


O2 Sat by Pulse Oximetry (%)  95   06/08/19 09:00











Constitutional: Yes: Obese, Other (confused, restless)


Eyes: Yes: Conjunctiva Clear, PERRL


HENT: Yes: WNL, Atraumatic, Normocephalic


Neck: Yes: WNL, Supple, Trachea Midline


Cardiovascular: Yes: Tachycardia, S1, S2


Respiratory: Yes: Diminished, Rhonchi


Gastrointestinal: Yes: WNL, Normal Bowel Sounds, Soft, Abdomen, Obese


Neurological: Yes: Confusion, Cran Nerves II-XII Intact


Psychiatric: Yes: Other (Restless)


Labs: 


 CBC, BMP





 06/08/19 10:10 





 06/08/19 10:10 











Hospitalist Encounter


Outcome: 





Reviewed EKG- ST no change compared to prior study


T- 100.5











Addendum


04:52-Rapid Response and Code 99 called on patient


CPR and resuscitative measures was initiated by the RR team


Patient was intubated by Anesthesia and transferred to ICU, where the ICU 

resident continued care


Patient's daughter was notified and arrived to unit.


Patient became asystolic, CPR resumed, resuscitative measures resumed, ROSC 

achieved, 


IV Pressors started


Patient's condition- guarded


0636- Call placed to the primary PCP's service to inform of overnight's events


[phone]-- NP Diamond responded, who was appraised of overnight 's events








Critical Care


Total Critical Care Time (in minutes): 50


Critical Care Statement: The care of this patient involved high complexity 

decision making to prevent further life threatening deterioration of the patient

's condition and/or to evaluate & treat vital organ system(s) failure or risk 

of failure.

## 2019-06-08 NOTE — PN
Progress Note, Physician


Chief Complaint: 





Acute Hypoxic Respiratory Failure


COPD exacerbation 


AMS


History of Present Illness: 


Mild SOB


Daughter at bedside


AxOx3


Medrol being tapered


Seen by Neurology


MRI brain-chronic sinusitis, age related changes








- Current Medication List


Current Medications: 


Active Medications





Acetaminophen (Tylenol -)  650 mg PO Q8H PRN


   PRN Reason: PAIN LEVEL 1-5


   Last Admin: 06/05/19 13:40 Dose:  650 mg


Albuterol Sulfate (Ventolin 0.083% Nebulizer Soln -)  1 amp NEB Q4H PRN


   PRN Reason: SHORT OF BREATH/WHEEZING


Albuterol/Ipratropium (Duoneb -)  1 amp NEB RQID [facility]


   Last Admin: 06/07/19 20:40 Dose:  1 amp


Aspirin (Asa -)  81 mg PO DAILY [facility]


   Last Admin: 06/07/19 09:00 Dose:  81 mg


Atorvastatin Calcium (Lipitor -)  40 mg PO HS [facility]


   Last Admin: 06/07/19 21:25 Dose:  40 mg


Benzocaine/Menthol (Cepacol Lozenge -)  1 each MM Q2H PRN


   PRN Reason: SORE THROAT


   Last Admin: 06/05/19 09:19 Dose:  1 each


Cyanocobalamin (Vitamin B12 Injection -)  1,000 mcg IM DAILY [facility]


   Stop: 06/11/19 10:01


   Last Admin: 06/07/19 09:38 Dose:  Not Given


Fluticasone Propionate (Flonase -)  1 spray NS BID [facility]


   Last Admin: 06/07/19 21:23 Dose:  1 spray


Folic Acid (Folic Acid -)  1 mg PO DAILY [facility]


   Last Admin: 06/07/19 09:00 Dose:  1 mg


Guaifenesin (Diabetic Tussin Dm -)  10 ml PO Q6H PRN


   PRN Reason: COUGH


   Last Admin: 06/06/19 17:14 Dose:  10 ml


Heparin Sodium (Porcine) (Heparin -)  5,000 unit SQ BID [facility]


   Last Admin: 06/07/19 21:25 Dose:  5,000 unit


Ceftriaxone Sodium 1 gm/ (Dextrose)  50 mls @ 200 mls/hr IVPB DAILY [facility]; 

Protocol


   Last Admin: 06/07/19 09:01 Dose:  200 mls/hr


Azithromycin (Zithromax 500mg Ivpb (Pre-Docked))  500 mg in 250 mls @ 250 mls/

hr IVPB DAILY [facility]


   Stop: 06/08/19 10:59


   Last Admin: 06/07/19 09:01 Dose:  250 mls/hr


Insulin Aspart (Novolog Vial Sliding Scale -)  1 vial SQ ACHS [facility]; Protocol


   Last Admin: 06/08/19 06:43 Dose:  Not Given


Loratadine (Claritin -)  10 mg PO DAILY [facility]


   Last Admin: 06/07/19 09:00 Dose:  10 mg


Methimazole (Tapazole -)  5 mg PO BID [facility]


   Last Admin: 06/07/19 21:27 Dose:  5 mg


Methylprednisolone Sodium Succinate (Solu-Medrol -)  40 mg IVPUSH DAILY [facility]


   Last Admin: 06/07/19 09:01 Dose:  40 mg


Metoprolol Succinate (Toprol Xl -)  12.5 mg PO DAILY [facility]


   Last Admin: 06/07/19 09:00 Dose:  12.5 mg


Nystatin (Nystatin)  500,000 unit PO TID [facility]


   Last Admin: 06/08/19 06:43 Dose:  Not Given


Olanzapine (Zyprexa -)  10 mg PO HS [facility]


   Last Admin: 06/07/19 21:27 Dose:  10 mg


Potassium Phos/Sodium Phos (Phos-Nak Packet -)  1 packet PO DAILY [facility]


   Last Admin: 06/07/19 09:01 Dose:  1 packet


Sodium Chloride (Ocean Spray Nasal Spray -)  2 spray NS TID PRN


   PRN Reason: NASAL CONGESTION


Thiamine HCl (Vitamin B1 Injection -)  200 mg IVPB DAILY [facility]


   Last Admin: 06/07/19 09:01 Dose:  200 mg











- Objective


Vital Signs: 


 Vital Signs











Temperature  99.0 F   06/08/19 06:33


 


Pulse Rate  106 H  06/08/19 06:33


 


Respiratory Rate  22 H  06/08/19 06:33


 


Blood Pressure  137/80   06/08/19 06:33


 


O2 Sat by Pulse Oximetry (%)  94 L  06/07/19 21:00











Constitutional: Yes: Well Nourished, No Distress, Calm


Cardiovascular: Yes: Regular Rate and Rhythm


Respiratory: Yes: On Nasal O2, Rales (BLL)


Gastrointestinal: Yes: WNL


Genitourinary: Yes: WNL


Musculoskeletal: Yes: WNL


Extremities: Yes: WNL


Edema: No


Peripheral Pulses WNL: Yes


Neurological: Yes: Alert, Oriented


Psychiatric: Yes: Alert, Oriented


Labs: 


 CBC, BMP





 06/07/19 06:15 





 06/07/19 06:15 











Problem List





- Problems


(1) Bronchitis


Code(s): J40 - BRONCHITIS, NOT SPECIFIED AS ACUTE OR CHRONIC   





Assessment/Plan





(1) THOMPSON (acute kidney injury)


Assessment/Plan: 


-renal on board


-monitor renal function daily


Code(s): N17.9 - ACUTE KIDNEY FAILURE, UNSPECIFIED   





(2) COPD exacerbation


Assessment/Plan: 


-Pulm on board


-IV medrol decrease to 20 mg po daily


-bronchodilators


-Azithromycin


-keep SpO2 >90%


-O2 via NC


-CXR shows no acute pathology


Code(s): J44.1 - CHRONIC OBSTRUCTIVE PULMONARY DISEASE W (ACUTE) EXACERBATION   





(3) HLD (hyperlipidemia)


Assessment/Plan: 


-Atorvastatin


Code(s): E78.5 - HYPERLIPIDEMIA, UNSPECIFIED   





(4) HTN (hypertension)


Assessment/Plan: 


-Metoprolol


Code(s): I10 - ESSENTIAL (PRIMARY) HYPERTENSION   





(5) Toxic metabolic encephalopathy


Assessment/Plan: 


-2/2 Steroid us?


-Brain MRI reviewed


-neurology on board


-Ceftriaxone


-UC neg


-Psych on board


-Zyprexa HS


Code(s): G92 - TOXIC ENCEPHALOPATHY

## 2019-06-09 LAB
ALBUMIN SERPL-MCNC: 2.4 G/DL (ref 3.4–5)
ALBUMIN SERPL-MCNC: 2.4 G/DL (ref 3.4–5)
ALP SERPL-CCNC: 66 U/L (ref 45–117)
ALP SERPL-CCNC: 71 U/L (ref 45–117)
ALT SERPL-CCNC: 3965 U/L (ref 13–61)
ANION GAP SERPL CALC-SCNC: 11 MMOL/L (ref 8–16)
ANION GAP SERPL CALC-SCNC: 16 MMOL/L (ref 8–16)
ANISOCYTOSIS BLD QL: (no result)
ARTERIAL BLOOD GAS PCO2: 52.2 MMHG (ref 35–45)
ARTERIAL BLOOD GAS PCO2: 56.2 MMHG (ref 35–45)
ARTERIAL PATENCY WRIST A: POSITIVE
ARTERIAL PATENCY WRIST A: POSITIVE
AST SERPL-CCNC: 5550 U/L (ref 15–37)
BASE EXCESS BLDA CALC-SCNC: -1.2 MEQ/L (ref -2–2)
BASE EXCESS BLDA CALC-SCNC: -5.2 MEQ/L (ref -2–2)
BASOPHILS # BLD: 0.2 % (ref 0–2)
BILIRUB SERPL-MCNC: 0.8 MG/DL (ref 0.2–1)
BILIRUB SERPL-MCNC: 0.9 MG/DL (ref 0.2–1)
BUN SERPL-MCNC: 28.6 MG/DL (ref 7–18)
BUN SERPL-MCNC: 28.7 MG/DL (ref 7–18)
CALCIUM SERPL-MCNC: 7.3 MG/DL (ref 8.5–10.1)
CALCIUM SERPL-MCNC: 7.8 MG/DL (ref 8.5–10.1)
CHLORIDE SERPL-SCNC: 102 MMOL/L (ref 98–107)
CHLORIDE SERPL-SCNC: 99 MMOL/L (ref 98–107)
CO2 SERPL-SCNC: 24 MMOL/L (ref 21–32)
CO2 SERPL-SCNC: 27 MMOL/L (ref 21–32)
CREAT SERPL-MCNC: 1.4 MG/DL (ref 0.55–1.3)
CREAT SERPL-MCNC: 1.6 MG/DL (ref 0.55–1.3)
DACRYOCYTES BLD QL SMEAR: (no result)
DEPRECATED RDW RBC AUTO: 12.9 % (ref 11.6–15.6)
DEPRECATED RDW RBC AUTO: 13 % (ref 11.6–15.6)
EOSINOPHIL # BLD: 0.1 % (ref 0–4.5)
GLUCOSE SERPL-MCNC: 180 MG/DL (ref 74–106)
GLUCOSE SERPL-MCNC: 207 MG/DL (ref 74–106)
HCT VFR BLD CALC: 31.4 % (ref 32.4–45.2)
HCT VFR BLD CALC: 32.5 % (ref 32.4–45.2)
HGB BLD-MCNC: 10.6 GM/DL (ref 10.7–15.3)
HGB BLD-MCNC: 10.8 GM/DL (ref 10.7–15.3)
LYMPHOCYTES # BLD: 16.2 % (ref 8–40)
MACROCYTES BLD QL: 0
MAGNESIUM SERPL-MCNC: 2.2 MG/DL (ref 1.8–2.4)
MCH RBC QN AUTO: 32 PG (ref 25.7–33.7)
MCH RBC QN AUTO: 32.9 PG (ref 25.7–33.7)
MCHC RBC AUTO-ENTMCNC: 33.3 G/DL (ref 32–36)
MCHC RBC AUTO-ENTMCNC: 33.7 G/DL (ref 32–36)
MCV RBC: 95.9 FL (ref 80–96)
MCV RBC: 97.6 FL (ref 80–96)
MONOCYTES # BLD AUTO: 4.1 % (ref 3.8–10.2)
NEUTROPHILS # BLD: 79.4 % (ref 42.8–82.8)
PHOSPHATE SERPL-MCNC: 7 MG/DL (ref 2.5–4.9)
PLATELET # BLD AUTO: 248 K/MM3 (ref 134–434)
PLATELET # BLD AUTO: 272 K/MM3 (ref 134–434)
PLATELET BLD QL SMEAR: NORMAL
PMV BLD: 7.1 FL (ref 7.5–11.1)
PMV BLD: 7.4 FL (ref 7.5–11.1)
PO2 BLDA: 211 MMHG (ref 80–105)
PO2 BLDA: 305 MMHG (ref 80–105)
POTASSIUM SERPLBLD-SCNC: 4.2 MMOL/L (ref 3.5–5.1)
POTASSIUM SERPLBLD-SCNC: 4.6 MMOL/L (ref 3.5–5.1)
PROT SERPL-MCNC: 4.9 G/DL (ref 6.4–8.2)
PROT SERPL-MCNC: 4.9 G/DL (ref 6.4–8.2)
RBC # BLD AUTO: 3.22 M/MM3 (ref 3.6–5.2)
RBC # BLD AUTO: 3.39 M/MM3 (ref 3.6–5.2)
SAO2 % BLDA: 99.5 % (ref 95–98)
SAO2 % BLDA: 99.5 % (ref 95–98)
SODIUM SERPL-SCNC: 139 MMOL/L (ref 136–145)
SODIUM SERPL-SCNC: 139 MMOL/L (ref 136–145)
WBC # BLD AUTO: 18.1 K/MM3 (ref 4–10)
WBC # BLD AUTO: 27.1 K/MM3 (ref 4–10)

## 2019-06-09 PROCEDURE — 5A1945Z RESPIRATORY VENTILATION, 24-96 CONSECUTIVE HOURS: ICD-10-PCS | Performed by: INTERNAL MEDICINE

## 2019-06-09 PROCEDURE — 0CHY7BZ INSERTION OF AIRWAY INTO MOUTH AND THROAT, VIA NATURAL OR ARTIFICIAL OPENING: ICD-10-PCS | Performed by: INTERNAL MEDICINE

## 2019-06-09 PROCEDURE — 5A12012 PERFORMANCE OF CARDIAC OUTPUT, SINGLE, MANUAL: ICD-10-PCS | Performed by: FAMILY MEDICINE

## 2019-06-09 RX ADMIN — INSULIN ASPART SCH UNITS: 100 INJECTION, SOLUTION INTRAVENOUS; SUBCUTANEOUS at 12:02

## 2019-06-09 RX ADMIN — HEPARIN SODIUM SCH MLS/HR: 5000 INJECTION, SOLUTION INTRAVENOUS at 23:48

## 2019-06-09 RX ADMIN — FOLIC ACID SCH: 1 TABLET ORAL at 16:03

## 2019-06-09 RX ADMIN — POTASSIUM & SODIUM PHOSPHATES POWDER PACK 280-160-250 MG SCH: 280-160-250 PACK at 16:03

## 2019-06-09 RX ADMIN — VANCOMYCIN HYDROCHLORIDE SCH: 1 INJECTION, POWDER, LYOPHILIZED, FOR SOLUTION INTRAVENOUS at 11:43

## 2019-06-09 RX ADMIN — SODIUM CHLORIDE SCH MLS/HR: 9 INJECTION, SOLUTION INTRAVENOUS at 13:18

## 2019-06-09 RX ADMIN — HEPARIN SODIUM SCH MLS/HR: 5000 INJECTION, SOLUTION INTRAVENOUS at 08:39

## 2019-06-09 RX ADMIN — FLUTICASONE PROPIONATE SCH: 50 SPRAY, METERED NASAL at 11:00

## 2019-06-09 RX ADMIN — INSULIN ASPART SCH: 100 INJECTION, SOLUTION INTRAVENOUS; SUBCUTANEOUS at 17:35

## 2019-06-09 RX ADMIN — DEXTROSE MONOHYDRATE SCH MLS/HR: 50 INJECTION, SOLUTION INTRAVENOUS at 06:30

## 2019-06-09 RX ADMIN — INSULIN ASPART SCH UNITS: 100 INJECTION, SOLUTION INTRAVENOUS; SUBCUTANEOUS at 21:49

## 2019-06-09 RX ADMIN — HYDROCORTISONE SODIUM SUCCINATE SCH MG: 100 INJECTION, POWDER, FOR SOLUTION INTRAMUSCULAR; INTRAVENOUS at 11:00

## 2019-06-09 RX ADMIN — SODIUM CHLORIDE SCH MLS/HR: 9 INJECTION, SOLUTION INTRAVENOUS at 05:00

## 2019-06-09 RX ADMIN — THIAMINE HYDROCHLORIDE SCH MG: 100 INJECTION, SOLUTION INTRAMUSCULAR; INTRAVENOUS at 13:22

## 2019-06-09 RX ADMIN — MEROPENEM SCH MLS/HR: 500 INJECTION, POWDER, FOR SOLUTION INTRAVENOUS at 12:24

## 2019-06-09 RX ADMIN — HYDROCORTISONE SODIUM SUCCINATE SCH MG: 100 INJECTION, POWDER, FOR SOLUTION INTRAMUSCULAR; INTRAVENOUS at 18:02

## 2019-06-09 RX ADMIN — ASPIRIN 81 MG SCH: 81 TABLET ORAL at 16:03

## 2019-06-09 RX ADMIN — CEFTRIAXONE SCH MLS/HR: 1 INJECTION, POWDER, FOR SOLUTION INTRAMUSCULAR; INTRAVENOUS at 09:05

## 2019-06-09 RX ADMIN — DEXTROSE MONOHYDRATE SCH MLS/HR: 50 INJECTION, SOLUTION INTRAVENOUS at 13:40

## 2019-06-09 RX ADMIN — LORATADINE SCH: 10 TABLET ORAL at 16:03

## 2019-06-09 RX ADMIN — MEROPENEM SCH MLS/HR: 500 INJECTION, POWDER, FOR SOLUTION INTRAVENOUS at 17:24

## 2019-06-09 RX ADMIN — ATORVASTATIN CALCIUM SCH: 40 TABLET, FILM COATED ORAL at 21:40

## 2019-06-09 RX ADMIN — INSULIN ASPART SCH UNITS: 100 INJECTION, SOLUTION INTRAVENOUS; SUBCUTANEOUS at 17:32

## 2019-06-09 RX ADMIN — INSULIN ASPART SCH UNITS: 100 INJECTION, SOLUTION INTRAVENOUS; SUBCUTANEOUS at 08:02

## 2019-06-09 RX ADMIN — FLUTICASONE PROPIONATE SCH: 50 SPRAY, METERED NASAL at 21:40

## 2019-06-09 RX ADMIN — CYANOCOBALAMIN SCH MCG: 1000 INJECTION, SOLUTION INTRAMUSCULAR at 13:19

## 2019-06-09 RX ADMIN — NYSTATIN SCH: 500000 TABLET, FILM COATED ORAL at 15:42

## 2019-06-09 RX ADMIN — METHIMAZOLE SCH: 5 TABLET ORAL at 16:03

## 2019-06-09 RX ADMIN — Medication SCH MLS/HR: at 10:59

## 2019-06-09 RX ADMIN — NYSTATIN SCH: 500000 TABLET, FILM COATED ORAL at 23:52

## 2019-06-09 RX ADMIN — NYSTATIN SCH: 500000 TABLET, FILM COATED ORAL at 07:58

## 2019-06-09 RX ADMIN — METHIMAZOLE SCH: 5 TABLET ORAL at 21:41

## 2019-06-09 RX ADMIN — DEXTROSE MONOHYDRATE SCH MLS/HR: 50 INJECTION, SOLUTION INTRAVENOUS at 18:04

## 2019-06-09 RX ADMIN — PANTOPRAZOLE SODIUM SCH MG: 40 INJECTION, POWDER, FOR SOLUTION INTRAVENOUS at 12:02

## 2019-06-09 NOTE — PN
Progress Note (short form)





- Note


Progress Note: 





ID CONSULT DICTATED


S/P CARDIOPULMONARY ARREST


R/O SEPSIS/ SEPTIC SHOCK


THOMPSON


PROBABLE SHOCK LIVER


LACTIC ACIDOSIS


COPD EXACERBATION


?AMOXICILLIN ALLERGY


OBTAIN CULTURES


HEMODYNAMIC/ VENTILATORY SUPPORT


EMPIRIC MEROPENEM + VANCOMYCIN


PROGNOSIS GUARDED


CRITICAL CARE TIME 40MIN

## 2019-06-09 NOTE — PN
Physical Exam: 


SUBJECTIVE: Patient seen and examined at bedside. Patient had three cardiac 

arrest overnight. Patient is intubated, unresponsive to sternal rub. Quivering 

of her left lower lip noted, concern for seizure activity. 





OBJECTIVE:





 Vital Signs











 Period  Temp  Pulse  Resp  BP Sys/Craft  Pulse Ox


 


 Last 24 Hr  97.8 F-100.2 F  101-150  15-24  /42-86  94-96








GENERAL: The patient is intubated. 


HEENT: Normocephalic. Pupils 1mm. Sclera anicteric. Dry mucous membranes. 


NECK: Supple, without lymphadenopathy


LUNGS: Mechanical breath sounds, equal air entry bilaterally. Faint rhonchi 

auscultated bilaterally. 


HEART: Tachycardic. S1, S2 auscultated without murmur, rub or gallop.


ABDOMEN: Obese abdomen. Soft, mildly distended. Hypoactive bowel sounds x4 

quadrants. Hepatomegaly palpated and percussed 2cm below costal margin. 


EXTREMITIES: 1+ radial, dorsalis pedis pulses bilaterally. Cool extremities. 1+ 

pitting edema bilateral lower extremities. 


NEUROLOGICAL: Patient is sedated. Unresponsive to sternal rub. 


SKIN: Warm, dry. Eccchymosis noted along abdomen. 





 Laboratory Results - last 24 hr











  06/08/19 06/08/19 06/08/19





  10:10 10:10 10:10


 


WBC  11.9 H  


 


RBC  3.67  


 


Hgb  11.9  


 


Hct  35.1  


 


MCV  95.8  


 


MCH  32.3  


 


MCHC  33.7  


 


RDW  12.7  


 


Plt Count  445 H D  


 


MPV  7.3 L  


 


Absolute Neuts (auto)  9.9 H  


 


Neutrophils %  83.4 H  


 


Neutrophils % (Manual)  88.9 H  


 


Band Neutrophils %  0.0  


 


Lymphocytes %  6.5 L  


 


Lymphocytes % (Manual)  6.1 L  


 


Monocytes %  10.0  


 


Monocytes % (Manual)  5  


 


Eosinophils %  0.0  D  


 


Eosinophils % (Manual)  0.0  


 


Basophils %  0.1  


 


Basophils % (Manual)  0.0  


 


Myelocytes % (Man)  0  


 


Promyelocytes % (Man)  0  


 


Blast Cells % (Manual)  0  


 


Nucleated RBC %  0  


 


Metamyelocytes  0  


 


Hypochromia  0  


 


Platelet Estimate  Normal  


 


Polychromasia  1+  


 


Poikilocytosis  0  


 


Anisocytosis  1+  


 


Microcytosis  0  


 


Macrocytosis  1+  


 


Puncture Site   


 


ABG pH   


 


ABG pCO2 at Pt Temp   


 


ABG pO2 at Pt Temp   


 


ABG HCO3   


 


ABG O2 Sat (Measured)   


 


ABG O2 Content   


 


ABG Base Excess   


 


Dylan Test   


 


O2 Delivery Device   


 


Oxygen Flow Rate   


 


Vent Mode   


 


Vent Rate   


 


Mechanical Rate   


 


PEEP   


 


Pressure Support Vent   


 


Sodium   135 L 


 


Potassium   3.5 


 


Chloride   96 L 


 


Carbon Dioxide   31 


 


Anion Gap   8 


 


BUN   24.9 H 


 


Creatinine   0.9 


 


Est GFR (CKD-EPI)AfAm   73.52 


 


Est GFR (CKD-EPI)NonAf   63.43 


 


POC Glucometer   


 


Random Glucose   178 H 


 


Hemoglobin A1c %    6.4 H


 


Lactic Acid   


 


Calcium   9.0 


 


Total Bilirubin   0.5 


 


AST   43 H 


 


ALT   52 


 


Alkaline Phosphatase   69 


 


Troponin I   


 


Total Protein   6.2 L 


 


Albumin   3.3 L 














  06/08/19 06/08/19 06/08/19





  11:18 16:32 22:15


 


WBC   


 


RBC   


 


Hgb   


 


Hct   


 


MCV   


 


MCH   


 


MCHC   


 


RDW   


 


Plt Count   


 


MPV   


 


Absolute Neuts (auto)   


 


Neutrophils %   


 


Neutrophils % (Manual)   


 


Band Neutrophils %   


 


Lymphocytes %   


 


Lymphocytes % (Manual)   


 


Monocytes %   


 


Monocytes % (Manual)   


 


Eosinophils %   


 


Eosinophils % (Manual)   


 


Basophils %   


 


Basophils % (Manual)   


 


Myelocytes % (Man)   


 


Promyelocytes % (Man)   


 


Blast Cells % (Manual)   


 


Nucleated RBC %   


 


Metamyelocytes   


 


Hypochromia   


 


Platelet Estimate   


 


Polychromasia   


 


Poikilocytosis   


 


Anisocytosis   


 


Microcytosis   


 


Macrocytosis   


 


Puncture Site   


 


ABG pH   


 


ABG pCO2 at Pt Temp   


 


ABG pO2 at Pt Temp   


 


ABG HCO3   


 


ABG O2 Sat (Measured)   


 


ABG O2 Content   


 


ABG Base Excess   


 


Dylan Test   


 


O2 Delivery Device   


 


Oxygen Flow Rate   


 


Vent Mode   


 


Vent Rate   


 


Mechanical Rate   


 


PEEP   


 


Pressure Support Vent   


 


Sodium   


 


Potassium   


 


Chloride   


 


Carbon Dioxide   


 


Anion Gap   


 


BUN   


 


Creatinine   


 


Est GFR (CKD-EPI)AfAm   


 


Est GFR (CKD-EPI)NonAf   


 


POC Glucometer  219  173  138


 


Random Glucose   


 


Hemoglobin A1c %   


 


Lactic Acid   


 


Calcium   


 


Total Bilirubin   


 


AST   


 


ALT   


 


Alkaline Phosphatase   


 


Troponin I   


 


Total Protein   


 


Albumin   














  06/09/19 06/09/19 06/09/19





  05:30 05:30 05:30


 


WBC  18.1 H  


 


RBC  3.22 L  


 


Hgb  10.6 L  


 


Hct  31.4 L  


 


MCV  97.6 H  


 


MCH  32.9  


 


MCHC  33.7  


 


RDW  13.0  


 


Plt Count  248  D  


 


MPV  7.4 L  


 


Absolute Neuts (auto)  14.4 H  


 


Neutrophils %  79.4  


 


Neutrophils % (Manual)   


 


Band Neutrophils %   


 


Lymphocytes %  16.2  D  


 


Lymphocytes % (Manual)   


 


Monocytes %  4.1  


 


Monocytes % (Manual)   


 


Eosinophils %  0.1  D  


 


Eosinophils % (Manual)   


 


Basophils %  0.2  


 


Basophils % (Manual)   


 


Myelocytes % (Man)   


 


Promyelocytes % (Man)   


 


Blast Cells % (Manual)   


 


Nucleated RBC %  1 H  


 


Metamyelocytes   


 


Hypochromia   


 


Platelet Estimate   


 


Polychromasia   


 


Poikilocytosis   


 


Anisocytosis   


 


Microcytosis   


 


Macrocytosis   


 


Puncture Site   


 


ABG pH   


 


ABG pCO2 at Pt Temp   


 


ABG pO2 at Pt Temp   


 


ABG HCO3   


 


ABG O2 Sat (Measured)   


 


ABG O2 Content   


 


ABG Base Excess   


 


Dylan Test   


 


O2 Delivery Device   


 


Oxygen Flow Rate   


 


Vent Mode   


 


Vent Rate   


 


Mechanical Rate   


 


PEEP   


 


Pressure Support Vent   


 


Sodium   139 


 


Potassium   4.6 


 


Chloride   99 


 


Carbon Dioxide   24 


 


Anion Gap   16 


 


BUN   28.7 H 


 


Creatinine   1.6 H 


 


Est GFR (CKD-EPI)AfAm   36.67 


 


Est GFR (CKD-EPI)NonAf   31.64 


 


POC Glucometer   


 


Random Glucose   180 H 


 


Hemoglobin A1c %   


 


Lactic Acid    9.4 H*


 


Calcium   7.8 L 


 


Total Bilirubin   0.8 


 


AST   4073 H 


 


ALT   3965 H 


 


Alkaline Phosphatase   66 


 


Troponin I   0.17 H 


 


Total Protein   4.9 L 


 


Albumin   2.4 L 














  06/09/19





  06:40


 


WBC 


 


RBC 


 


Hgb 


 


Hct 


 


MCV 


 


MCH 


 


MCHC 


 


RDW 


 


Plt Count 


 


MPV 


 


Absolute Neuts (auto) 


 


Neutrophils % 


 


Neutrophils % (Manual) 


 


Band Neutrophils % 


 


Lymphocytes % 


 


Lymphocytes % (Manual) 


 


Monocytes % 


 


Monocytes % (Manual) 


 


Eosinophils % 


 


Eosinophils % (Manual) 


 


Basophils % 


 


Basophils % (Manual) 


 


Myelocytes % (Man) 


 


Promyelocytes % (Man) 


 


Blast Cells % (Manual) 


 


Nucleated RBC % 


 


Metamyelocytes 


 


Hypochromia 


 


Platelet Estimate 


 


Polychromasia 


 


Poikilocytosis 


 


Anisocytosis 


 


Microcytosis 


 


Macrocytosis 


 


Puncture Site  Right radial


 


ABG pH  7.22 L


 


ABG pCO2 at Pt Temp  56.2 H


 


ABG pO2 at Pt Temp  305 H


 


ABG HCO3  22.2


 


ABG O2 Sat (Measured)  99.5 H


 


ABG O2 Content  13.8 L


 


ABG Base Excess  -5.2 L


 


Dylan Test  Positive


 


O2 Delivery Device  Vent


 


Oxygen Flow Rate  100%


 


Vent Mode  A/c


 


Vent Rate  15


 


Mechanical Rate  Yes


 


PEEP  5.0


 


Pressure Support Vent  450


 


Sodium 


 


Potassium 


 


Chloride 


 


Carbon Dioxide 


 


Anion Gap 


 


BUN 


 


Creatinine 


 


Est GFR (CKD-EPI)AfAm 


 


Est GFR (CKD-EPI)NonAf 


 


POC Glucometer 


 


Random Glucose 


 


Hemoglobin A1c % 


 


Lactic Acid 


 


Calcium 


 


Total Bilirubin 


 


AST 


 


ALT 


 


Alkaline Phosphatase 


 


Troponin I 


 


Total Protein 


 


Albumin 








Active Medications











Generic Name Dose Route Start Last Admin





  Trade Name Freq  PRN Reason Stop Dose Admin


 


Acetaminophen  650 mg  06/05/19 12:49  06/05/19 13:40





  Tylenol -  PO   650 mg





  Q8H PRN   Administration





  PAIN LEVEL 1-5   





     





     





     


 


Albuterol Sulfate  1 amp  06/06/19 10:27  





  Ventolin 0.083% Nebulizer Soln -  NEB   





  Q4H PRN   





  SHORT OF BREATH/WHEEZING   





     





     





     


 


Aspirin  81 mg  06/04/19 10:00  06/08/19 09:11





  Asa -  PO   81 mg





  DAILY DORIE   Administration





     





     





     





     


 


Atorvastatin Calcium  40 mg  06/03/19 22:00  06/08/19 22:18





  Lipitor -  PO   40 mg





  HS DORIE   Administration





     





     





     





     


 


Benzocaine/Menthol  1 each  06/04/19 18:19  06/05/19 09:19





  Cepacol Lozenge -  MM   1 each





  Q2H PRN   Administration





  SORE THROAT   





     





     





     


 


Cyanocobalamin  1,000 mcg  06/05/19 10:45  06/08/19 09:14





  Vitamin B12 Injection -  IM  06/11/19 10:01  1,000 mcg





  DAILY DORIE   Administration





     





     





     





     


 


Fluticasone Propionate  1 spray  06/05/19 22:00  06/08/19 22:18





  Flonase -  NS   Not Given





  BID DORIE   





     





     





     





     


 


Folic Acid  1 mg  06/05/19 10:45  06/08/19 09:12





  Folic Acid -  PO   1 mg





  DAILY DORIE   Administration





     





     





     





     


 


Guaifenesin  10 ml  06/03/19 21:29  06/08/19 14:30





  Diabetic Tussin Dm -  PO   10 ml





  Q6H PRN   Administration





  COUGH   





     





     





     


 


Heparin Sodium (Porcine)  3,500 unit  06/09/19 06:48  





  Heparin -  40 unit/kg (3500 unit)   





  IVPUSH   





  PRN PRN   





  For aPTT 35 to 45 seconds   





     





     





     


 


Heparin Sodium (Porcine)  7,000 unit  06/09/19 06:48  





  Heparin -  80 unit/kg (7000 unit)   





  IVPUSH   





  PRN PRN   





  aPTT <35 seconds   





     





     





     


 


Ceftriaxone Sodium 1 gm/  50 mls @ 200 mls/hr  06/04/19 10:00  06/08/19 09:11





  Dextrose  IVPB   200 mls/hr





  DAILY DORIE   Administration





     





     





  Protocol   





     


 


Vasopressin 50 units/ Sodium  100 mls @ 4 mls/hr  06/09/19 05:30  06/09/19 05:15





  Chloride  IVPB   6 units/hr





  ASDIR DORIE   12 mls/hr





     Titration





     





  Protocol   





  2 UNITS/HR   


 


Norepinephrine Bitartrate 8,  500 mls @ 18.75 mls/hr  06/09/19 06:30  06/09/19 

06:40





  000 mcg/ Dextrose  IV   10 mcg/min





  TITR DORIE   37.5 mls/hr





     Titration





     





  Protocol   





  5 MCG/MIN   


 


Heparin Sodium/Dextrose  25,000 units in 500 mls @ 31.515 mls/hr  06/09/19 07:

00  





  Heparin Infusion -  IVPB   





  TITR DORIE   





     





     





  Protocol   





  18 UNITS/KG/HR   


 


Sodium Chloride  1,000 mls @ 1,000 mls/hr  06/09/19 07:47  06/09/19 05:00





  Normal Saline -  IV  06/09/19 08:46  1,000 mls/hr





  ASDIR STA   Administration





     





     





     





     


 


Sodium Chloride  500 mls @ 500 mls/hr  06/09/19 07:47  06/09/19 06:00





  Normal Saline -  IV  06/09/19 08:46  500 mls/hr





  ASDIR STA   Administration





     





     





     





     


 


Insulin Aspart  1 vial  06/03/19 22:00  06/09/19 08:02





  Novolog Vial Sliding Scale -  SQ   2 units





  ACHS DORIE   Administration





     





     





  Protocol   





     


 


Loratadine  10 mg  06/05/19 15:30  06/08/19 09:13





  Claritin -  PO   10 mg





  DAILY DORIE   Administration





     





     





     





     


 


Methimazole  5 mg  06/07/19 22:00  06/08/19 22:18





  Tapazole -  PO   5 mg





  BID DORIE   Administration





     





     





     





     


 


Methylprednisolone Sodium Succinate  20 mg  06/09/19 10:00  





  Solu-Medrol -  IVPUSH   





  DAILY DORIE   





     





     





     





     


 


Metoprolol Succinate  12.5 mg  06/04/19 10:00  06/08/19 09:12





  Toprol Xl -  PO   12.5 mg





  DAILY DORIE   Administration





     





     





     





     


 


Nystatin  500,000 unit  06/07/19 22:00  06/09/19 07:58





  Nystatin  PO   Not Given





  TID DORIE   





     





     





     





     


 


Olanzapine  10 mg  06/07/19 22:00  06/08/19 22:18





  Zyprexa -  PO   10 mg





  HS DORIE   Administration





     





     





     





     


 


Potassium Phos/Sodium Phos  1 packet  06/07/19 10:00  06/08/19 09:11





  Phos-Nak Packet -  PO   1 packet





  DAILY DORIE   Administration





     





     





     





     


 


Sodium Chloride  2 spray  06/05/19 15:21  





  Ocean Spray Nasal Spray -  NS   





  TID PRN   





  NASAL CONGESTION   





     





     





     


 


Thiamine HCl  200 mg  06/05/19 10:45  06/08/19 11:13





  Vitamin B1 Injection -  IVPB   200 mg





  DAILY DORIE   Administration





     





     





     





     











ASSESSMENT/PLAN:


Patient is a 74 year old female with history of COPD, hypertension, 

hyperlipidemia, osteoarthritis, pre-diabetes, presented with complaint of 

shortness of breath. Admitted to ICU after cardiac arrest. 





Neurologic


 -Patient is sedated on Versed, Fentanyl drip. Patient was unresponsive to voice

, and sternal rub prior to initiation of sedation 


 -Hold Olanzapine while patient sedated


 -Monitor for signs of mental status changes


 -Neurology consult (Dr. Garcia) appreciated





Cardiac


S/P three episodes cardiac arrest 


Afib vs. SVT 


History of hypertension


History of hyperlipidemia


 -EKG shows


 -Troponin 0.17 -> 0.83. Trend to peak


 -Patient started on Heparin drip. Follow PTT


 -Hypothermia protocol initiated


 -Hydrocortisone 100mg IV Q8H stress dose steroids


 -Patient recieved Adenosine 6mg IV, and 12mg IV push. Amiodarone 150mg IV


 -Amiodarone drip 


 -Cardiac ECHO


 -Cardiac monitoring 


 -CVP monitoring


 -Cardiology recommendations (Dr. Dorsey) appreciated





Pulmonary


 -Intubated. Ventilator settings RR 15,  FiO2 60%, PEEP 5


 -Chest radiograph shows


 -ABG pH 7.20, CO2 52.2, O2 211, HCO3 25.1. Suggestive of respiratory acidosis


 -Maintain oxygen saturation greater than 90%


 -Daily CXR


 -Repeat ABG tomorrow morning. 


 -CTA chest to evaluate for pulmonary embolism (once creatinine improves). 

Duplex US lower extremities negative for pulmonary embolism. 





Gastrointestinal


 -Currently NPO while intubated


 -Transamitis likely secondary to shock. AST 4073, ALT 3965, Alkaline 

phosphatase 66, total bilirubin 0.9


 -Will trend CMP


 


Renal


 -THOMPSON likely secondary to hypoperfusion


 -BUN 28.6 / Cr 1.4


 -Follow barnhart catheter output


 -Will trend CMP


 -Nephrology recommendations (Dr. Godinez) appreciated





Infectious disease


 -WBC increased to 27.1 s/p cardiac arrest. 


 -Repeat blood, urine cultures


 -Meropenem 500mg IV Q8H


 -Vancomycin 1000mg IV Q24H


 -Random Vancomycin level tomorrow


 -Infectious disease recommendations (Dr. Darling) appreciated. 


 


FEN


 -Fluids: No IV fluids currently indicated. 


 -Electrolytes: Currently, within normal limits. Follow CMP, replete as 

necessary. 


 -Nutrition: NPO while intubated. 





Prophylaxis


 -Patient is on Heparin drip 


 -Protonix 40mg IV daily





Disposition


 -Continue care in ICU





Visit type





- Emergency Visit


Emergency Visit: Yes


ED Registration Date: 06/03/19


Care time: The patient presented to the Emergency Department on the above date 

and was hospitalized for further evaluation of their emergent condition.





- New Patient


This patient is new to me today: Yes


Date on this admission: 06/09/19





- Critical Care


Critical Care patient: Yes


Total Critical Care Time (in minutes): 37


Critical Care Statement: The care of this patient involved high complexity 

decision making to prevent further life threatening deterioration of the patient

's condition and/or to evaluate & treat vital organ system(s) failure or risk 

of failure.





- Discharge Referral


Referred to [facility] No

## 2019-06-09 NOTE — CON.CARD
Consult


Consult Specialty:: Cardiology


Referred by:: ICU/Dr. Wang


Reason for Consultation:: cardiac arrest, afib with rvr





- History of Present Illness


Chief Complaint: cardiac arrest/afib with rvr


History of Present Illness: 


74 year old woman with pmh HTN, HLD, borderline DMII, COPD initially admitted 

for AE COPD, hypoxic respiratory failure treated with steroids and Abx, last 

night was found unresponsive, code 99 called and CPR administered with ROSC 

after several round of epi, bicarb, followed by 1 cardioversion, hypothermia 

protocol initiated. This am pt noted to be in Afib with RVR and amiodarone was 

started followed by conversion to NSR. She is on heparin gtt for empiric 

treatment of possible PE. 


She is currently intubated, sedated, unresponsive.


There was no reported chest pain prior to the event. No known cardiac history.





- History Source


History Provided By: Medical Record


Limitations to Obtaining History: Unresponsive





- Past Medical History


Cardio/Vascular: Yes: HTN, Hyperlipdemia


Pulmonary: Yes: COPD


Musculoskeletal: Yes: Osteoarthritis


ENT: Yes: Allergic Rhinitis


Endocrine: Yes: Diabetes Mellitus (Prediabetes)





- Past Surgical History


Past Surgical History: Yes: Joint Replacement (right hip replacement, left arm 

surgery)





- Alcohol/Substance Use


Hx Alcohol Use: No


History of Substance Use: reports: None





- Smoking History


Smoking history: Former smoker (smoked over 1 ppd for over 40 years, quit 6 

years ago)


Have you smoked in the past 12 months: No


Aproximately how many cigarettes per day: 0


If you are a former smoker, when did you quit?: 2012





- Social History


Usual Living Arrangement: Alone


ADL: Independent


History of Recent Travel: No





Home Medications





- Allergies


Allergies/Adverse Reactions: 


 Allergies











Allergy/AdvReac Type Severity Reaction Status Date / Time


 


No Known Drug Allergies Allergy   Verified 06/03/19 20:56














- Home Medications


Home Medications: 


Ambulatory Orders





Atorvastatin Ca [Lipitor] 40 mg PO HS 06/28/13 


Cholecalciferol (Vitamin D3) [Vitamin D3] 1,000 unit PO DAILY 12/04/15 


Cyanocobalamin [Vitamin B12 -] 1,000 mcg PO DAILY 12/04/15 


Omega-3 Fatty Acids [Fish Oil] 300 mg PO DAILY 12/04/15 


Aspirin [ASA -] 81 mg PO DAILY 11/14/16 


Metoprolol Succinate [Toprol XL -] 12.5 mg PO DAILY 11/14/16 











Family Disease History





- Family Disease History


Family Disease History: Other: Father (htn), Mother (htn)





Review of Systems





- Review of Systems


Constitutional: denies: No Symptoms, Chills, Diaphoresis, Fever, Lethargy, Loss 

of Appetite, Malaise, Night Sweats, Unintentional Wgt. Loss, Weakness, Other


Eyes: denies: No Symptoms, Blind Spots, Blurred Vision, Double Vision, Eye Pain

, Floaters, Photophobia, Recent Change in Vision, Other


HENT: denies: No Symptoms, Difficult Swallowing, Ear Discharge, Ear Pain, 

Epistaxis, Gingival Bleeding, Hearing Loss, Mouth Swelling, Nasal Congestion, 

Ocular Prosthesis, Throat Pain, Toothache, Ringing in Ears, Other


Neck: denies: No Symptoms, Decreased ROM, Lumps, Pain on Movement, Stiffness, 

Swollen Glands, Tenderness, Other


Cardiovascular: reports: Shortness of Breath.  denies: No Symptoms, Chest Pain, 

Edema, Palpitations, Other


Respiratory: reports: SOB, SOB on Exertion.  denies: No Symptoms, Cough, 

Exercise Intolerance, Hemoptysis, Orthopnea, PND, Snoring, Wheezing, Other


Gastrointestinal: denies: No Symptoms, Abdominal Pain, Bloating, Constipation, 

Diarrhea, Dysphagia, Indigestion, Melena, Nausea, Rectal Bleeding, Vomiting, 

Vomiting Blood, Other


Genitourinary: denies: No Symptoms, Burning, Discharge, Dysuria, Flank Pain, 

Frequency, Hematuria, Incontinence, Lesions, Menses, Pain, Testicular Mass, 

Testicular Pain, Testicular Swelling, Urgency, Vaginal Bleeding, Other


Breasts: denies: No Symptoms Reported, See HPI, Breast Implants, Discharge from 

Nipple, Lumps, Pain, Skin Changes, Other


Musculoskeletal: denies: No Symptoms, Back Pain, Crepitus, Decreased ROM, 

Extremity Pain, Joint Pain, Joint Swelling, Muscle Pain, Muscle Cramps, Muscle 

Weakness, Other


Integumentary: denies: No Symptoms, Blister, Bruising, Change in Color, Eczema, 

Erythema, Incision, Lesions, Lump, Pallor, Pruritis, Rash, Wound, Other


Neurological: denies: No Symptoms, Change in LOC, Change in Speech, Confusion, 

Dizziness, Headache, Incoordination, Numbness, Parasthesia, Pre-Existing Deficit

, Seizure, Syncope, Tremors, Unsteady Gait, Weakness, Other


Endocrine: denies: No Symptoms, Excessive Sweating, Flushing, Increased Hunger, 

Increased Thirst, Intolerance to Cold, Intolerance to Heat, Unexplained Weight 

Gain, Unexplained Weight Loss, Other


Hematology/Lymphatic: denies: No Symptoms, Easily Bruised, Excessive Bleeding, 

Swollen Glands, Other


Psychiatric: denies: No Symptoms, Altered Sleep Pattern, Anxiety, Depression, 

Hallucinations, Panic, Paranoia, Suicidal, Other





- Risk Factors


Known Risk Factors: Yes: Diabetes Mellitus, Hypercholesterolemia, Hypertension


Vital Signs: 


 Vital Signs











Temperature  92.1 F L  06/09/19 13:53


 


Pulse Rate  68   06/09/19 13:53


 


Respiratory Rate  24 H  06/09/19 13:53


 


Blood Pressure  116/43 L  06/09/19 13:53


 


O2 Sat by Pulse Oximetry (%)  98   06/09/19 09:00











Constitutional: Yes: No Distress, Calm


HENT: Yes: Atraumatic, Normocephalic


Respiratory: Yes: Regular, Intubated, Mechanically Ventilated.  No: Rales, 

Rhonchi, SOB, Wheezes


Gastrointestinal: Yes: Normal Bowel Sounds, Soft


Cardiovascular: Yes: Regular Rate and Rhythm.  No: Bradycardia, Tachycardia, 

Pulse Irregular, Gallop, Rub, Varicosities


JVD: No


Carotid Bruit: No


PMI: Non-Displaced


Heart Sounds: Yes: S1, S2.  No: Split S2, S3, S4, Clicks, Gallop, Rub, Bruit


Murmur: No: Systolic Murmur, Diastolic Murmur


Edema: No


Peripheral Pulses WNL: Yes


Neurological: Yes: Unresponsive.  No: Alert, Oriented


Psychiatric: No: Alert, Oriented





- Other Data


Labs, Other Data: 


 CBC, BMP





 06/09/19 08:19 





 06/09/19 08:19 





 Troponin, BNP











  06/09/19 06/09/19





  05:30 08:19


 


Troponin I  0.17 H  0.83 H*








 Troponin, BNP











  06/09/19 06/09/19





  05:30 08:19


 


Troponin I  0.17 H  0.83 H*











EKG-AFib with RVR--NSR, no sig ST abnl








Imaging





- Results


Chest X-ray: Report Reviewed, Image Reviewed


EKG: Report Reviewed, Image Reviewed


Other: Report Reviewed, Image Reviewed (tele-Afib with RVR-->NSR)





Assessment/Plan


74 year old woman with pmh HTN, HLD, borderline DMII, COPD initially admitted 

for AE COPD, hypoxic respiratory failure treated with steroids and Abx, last 

night was found unresponsive, code 99 called and CPR administered with ROSC 

after several round of epi, bicarb, followed by 1 cardioversion, hypothermia 

protocol initiated. This am pt noted to be in Afib with RVR and amiodarone was 

started followed by conversion to NSR. She is on heparin gtt for empiric 

treatment of possible PE. 


She is currently intubated, sedated, unresponsive.


There was no reported chest pain prior to the event. No known cardiac history.





Cardiac arrest-PEA arrrest with 1 shock given after CPR/epi given initially. 

details not currently available in regards to VT


-uncertain etiology, pulmonary embolism vs MI vs respiratory failure


-EKG not c/w STEMI


-troponin mildly elevated (no ck level done yet), could be secondary to CPR, 

demand ischemia, or NSTEMI


-cont care as per ICU, hypothermia protocol


-cont to trend cardiac enzymes and include CK/CKMB


-check echo


-cont heparin gtt


-cont ASA 81mg daily and statin


-no indication for urgent cardiac catheterization at this time





Pafib with RVR


-back in NSR after amiodarone


-after cardiac arrest and ROSC


-could be secondary to PE if present


-cont amiodarone gtt


-start Bblocker when off pressors if BP tolerates


-on heparin gtt for above reasons


-monitor tele





Will follow

## 2019-06-09 NOTE — PN
Teaching Attending Note


Name of Resident: Jerel Thomas





ATTENDING PHYSICIAN STATEMENT





I saw and evaluated the patient.


I reviewed the resident's note and discussed the case with the resident.


I agree with the resident's findings and plan as documented.








SUBJECTIVE:





Pt seen and examined in the ICU. Overnight events noted, s/p cardiac arrest x 

3. Now intubated on levophed and vasopressin gtts. Tachycardic this AM with 

episodes of sinus and ?afib.





OBJECTIVE:





 Vital Signs











 Period  Temp  Pulse  Resp  BP Sys/Craft  Pulse Ox


 


 Last 24 Hr  94.8 F-100.2 F    15-24  /42-86  








 Intake & Output











 06/06/19 06/07/19 06/08/19 06/09/19





 23:59 23:59 23:59 23:59


 


Intake Total 500 


 


Output Total    510


 


Balance 500 


 


Weight    89.131 kg








Gen:  intubated, sedated


Heart: RRR


Lung: decreased breath sounds at the bases


Abd: soft, nontender


Ext: no edema





 CBC, BMP





 06/09/19 08:19 





 06/09/19 08:19 





Active Medications





Acetaminophen (Tylenol -)  650 mg PO Q8H PRN


   PRN Reason: PAIN LEVEL 1-5


   Last Admin: 06/05/19 13:40 Dose:  650 mg


Albuterol Sulfate (Ventolin 0.083% Nebulizer Soln -)  1 amp NEB Q4H PRN


   PRN Reason: SHORT OF BREATH/WHEEZING


Aspirin (Asa -)  81 mg PO DAILY [facility]


   Last Admin: 06/08/19 09:11 Dose:  81 mg


Atorvastatin Calcium (Lipitor -)  40 mg PO HS [facility]


   Last Admin: 06/08/19 22:18 Dose:  40 mg


Benzocaine/Menthol (Cepacol Lozenge -)  1 each MM Q2H PRN


   PRN Reason: SORE THROAT


   Last Admin: 06/05/19 09:19 Dose:  1 each


Cyanocobalamin (Vitamin B12 Injection -)  1,000 mcg IM DAILY [facility]


   Stop: 06/11/19 10:01


   Last Admin: 06/08/19 09:14 Dose:  1,000 mcg


Fluticasone Propionate (Flonase -)  1 spray NS BID [facility]


   Last Admin: 06/08/19 22:18 Dose:  Not Given


Folic Acid (Folic Acid -)  1 mg PO DAILY [facility]


   Last Admin: 06/08/19 09:12 Dose:  1 mg


Guaifenesin (Diabetic Tussin Dm -)  10 ml PO Q6H PRN


   PRN Reason: COUGH


   Last Admin: 06/08/19 14:30 Dose:  10 ml


Heparin Sodium (Porcine) (Heparin -)  3,500 unit 40 unit/kg (3500 unit) IVPUSH 

PRN PRN


   PRN Reason: For aPTT 35 to 45 seconds


Heparin Sodium (Porcine) (Heparin -)  7,000 unit 80 unit/kg (7000 unit) IVPUSH 

PRN PRN


   PRN Reason: aPTT <35 seconds


Hydrocortisone Sodium Succinate (Solu-Cortef -)  100 mg IVPUSH Q8H-IV [facility]


Vasopressin 50 units/ Sodium (Chloride)  100 mls @ 4 mls/hr IVPB ASDIR [facility]; 

Protocol


   Last Titration: 06/09/19 05:15 Dose:  6 units/hr, 12 mls/hr


Norepinephrine Bitartrate 8, (000 mcg/ Dextrose)  500 mls @ 18.75 mls/hr IV 

TITR [facility]; Protocol


   Last Titration: 06/09/19 09:40 Dose:  12 mcg/min, 45 mls/hr


Heparin Sodium/Dextrose (Heparin Infusion -)  25,000 units in 500 mls @ 31.515 

mls/hr IVPB TITR [facility]; Protocol


   Last Admin: 06/09/19 08:39 Dose:  18 units/kg/hr, 31.515 mls/hr


Midazolam HCl (Midazolam 100mg/100ml-0.9%Nacl)  100 mg in 100 mls @ 1 mls/hr 

IVPB TITR [facility]; Protocol


Insulin Aspart (Novolog Vial Sliding Scale -)  1 vial SQ ACHS [facility]; Protocol


   Last Admin: 06/09/19 08:02 Dose:  2 units


Loratadine (Claritin -)  10 mg PO DAILY [facility]


   Last Admin: 06/08/19 09:13 Dose:  10 mg


Methimazole (Tapazole -)  5 mg PO BID [facility]


   Last Admin: 06/08/19 22:18 Dose:  5 mg


Metoprolol Succinate (Toprol Xl -)  12.5 mg PO DAILY [facility]


   Last Admin: 06/08/19 09:12 Dose:  12.5 mg


Nystatin (Nystatin)  500,000 unit PO TID [facility]


   Last Admin: 06/09/19 07:58 Dose:  Not Given


Potassium Phos/Sodium Phos (Phos-Nak Packet -)  1 packet PO DAILY [facility]


   Last Admin: 06/08/19 09:11 Dose:  1 packet


Sodium Chloride (Ocean Spray Nasal Spray -)  2 spray NS TID PRN


   PRN Reason: NASAL CONGESTION


Thiamine HCl (Vitamin B1 Injection -)  200 mg IVPB DAILY [facility]


   Last Admin: 06/08/19 11:13 Dose:  200 mg








ASSESSMENT AND PLAN:


s/p Cardiopulmonary Arrest


Shock/Multiorgan Dysfunction


r/o PE


Acute Kidney Injury


Lactic Acidosis


Elevated LFTs likely Ischemic Injury


+Troponins likely Demand Ischemia


SVT vs Rapid Atrial Fibrillation


Acute COPD Exacerbation


Acute Bronchitis/Sinusitis


HTN


Hyperlipidemia


Nonspecific Lung Nodules





-  agree with empiric anticoagulation


-  on hypothermia protocol


-  amiodarone trial


-  check CVP, keep 8-12


-  titrate pressors to maintain MAP >65


-  trend cardiac enzymes


-  echocardiogram


-  will need CTA chest when renal function improves


-  empiric stress dose steroids


-  inhaled bronchodilators standing and PRN


-  pan culture


-  broaden antibiotic coverage


-  sedate for vent synchrony


-  titrate Fio2 to keep Spo2 >90%


-  lighten sedation when hypothermia protocol complete to assess mental status


-  spontaneous breathing trials when mental status improved


-  outpt f/u of lung nodules


-  DVT/GI prophylaxis


-  prognosis guarded


-  continue ICU monitoring








critical care time spent in reviewing chart, evaluating patient and formulating 

plan 45 min





Problem List





- Problems


(1) COPD exacerbation


Code(s): J44.1 - CHRONIC OBSTRUCTIVE PULMONARY DISEASE W (ACUTE) EXACERBATION

## 2019-06-09 NOTE — PN
Progress Note, Physician


Chief Complaint: 





Acute Hypoxic Respiratory Failure


COPD exacerbation 


AMS


History of Present Illness: 


Cardiac arrest x 3+ respiratory arrest overnight


[facility]


-SVT's, hypotensive on pressors


Intermittent twitching noted with eye opening





- Current Medication List


Current Medications: 


Active Medications





Acetaminophen (Tylenol -)  650 mg PO Q8H PRN


   PRN Reason: PAIN LEVEL 1-5


   Last Admin: 06/05/19 13:40 Dose:  650 mg


Albuterol Sulfate (Ventolin 0.083% Nebulizer Soln -)  1 amp NEB Q4H PRN


   PRN Reason: SHORT OF BREATH/WHEEZING


Aspirin (Asa -)  81 mg PO DAILY [facility]


   Last Admin: 06/08/19 09:11 Dose:  81 mg


Atorvastatin Calcium (Lipitor -)  40 mg PO HS [facility]


   Last Admin: 06/08/19 22:18 Dose:  40 mg


Benzocaine/Menthol (Cepacol Lozenge -)  1 each MM Q2H PRN


   PRN Reason: SORE THROAT


   Last Admin: 06/05/19 09:19 Dose:  1 each


Cyanocobalamin (Vitamin B12 Injection -)  1,000 mcg IM DAILY [facility]


   Stop: 06/11/19 10:01


   Last Admin: 06/09/19 13:19 Dose:  1,000 mcg


Fluticasone Propionate (Flonase -)  1 spray NS BID [facility]


   Last Admin: 06/09/19 11:00 Dose:  Not Given


Folic Acid (Folic Acid -)  1 mg PO DAILY [facility]


   Last Admin: 06/08/19 09:12 Dose:  1 mg


Guaifenesin (Diabetic Tussin Dm -)  10 ml PO Q6H PRN


   PRN Reason: COUGH


   Last Admin: 06/08/19 14:30 Dose:  10 ml


Heparin Sodium (Porcine) (Heparin -)  3,500 unit 40 unit/kg (3500 unit) IVPUSH 

PRN PRN


   PRN Reason: For aPTT 35 to 45 seconds


Heparin Sodium (Porcine) (Heparin -)  7,000 unit 80 unit/kg (7000 unit) IVPUSH 

PRN PRN


   PRN Reason: aPTT <35 seconds


Hydrocortisone Sodium Succinate (Solu-Cortef -)  100 mg IVPUSH Q8H-IV [facility]


   Last Admin: 06/09/19 11:00 Dose:  100 mg


Vasopressin 50 units/ Sodium (Chloride)  100 mls @ 4 mls/hr IVPB ASDIR [facility]; 

Protocol


   Last Admin: 06/09/19 13:18 Dose:  3 units/hr, 6 mls/hr


Norepinephrine Bitartrate 8, (000 mcg/ Dextrose)  500 mls @ 18.75 mls/hr IV 

TITR [facility]; Protocol


   Last Titration: 06/09/19 11:00 Dose:  15 mcg/min, 56.25 mls/hr


Heparin Sodium/Dextrose (Heparin Infusion -)  25,000 units in 500 mls @ 31.515 

mls/hr IVPB TITR [facility]; Protocol


   Last Admin: 06/09/19 08:39 Dose:  18 units/kg/hr, 31.515 mls/hr


Midazolam HCl (Midazolam 100mg/100ml-0.9%Nacl)  100 mg in 100 mls @ 1 mls/hr 

IVPB TITR [facility]; Protocol


   Last Infusion: 06/09/19 13:16 Dose:  4 mg/hr, 4 mls/hr


Vancomycin HCl (Vancomycin (Pre-Docked))  1,000 mg in 250 mls @ 166.667 mls/hr 

IVPB Q24H [facility]; Protocol


   Last Admin: 06/09/19 11:43 Dose:  Not Given


Meropenem 500 mg/ Dextrose  100 mls @ 200 mls/hr IVPB Q8H-IV [facility]


   Last Admin: 06/09/19 12:24 Dose:  200 mls/hr


Fentanyl 500 mcg/ Dextrose  100 mls @ 0 mls/hr IVPB TITR [facility]; Protocol


Amiodarone HCl/Dextrose (Nexterone 360 Mg/200 Ml Bag)  360 mg in 200 mls @ 

16.667 mls/hr IVPB ASDIR [facility]; Protocol


Amiodarone HCl/Dextrose (Nexterone 360 Mg/200 Ml Bag)  360 mg in 200 mls @ 

33.333 mls/hr IVPB ONCE [facility]; Protocol


   Stop: 06/09/19 19:44


Insulin Aspart (Novolog Vial Sliding Scale -)  1 vial SQ ACHS [facility]; Protocol


   Last Admin: 06/09/19 12:02 Dose:  6 units


Loratadine (Claritin -)  10 mg PO DAILY [facility]


   Last Admin: 06/08/19 09:13 Dose:  10 mg


Methimazole (Tapazole -)  5 mg PO BID [facility]


   Last Admin: 06/08/19 22:18 Dose:  5 mg


Metoprolol Succinate (Toprol Xl -)  12.5 mg PO DAILY [facility]


   Last Admin: 06/08/19 09:12 Dose:  12.5 mg


Nystatin (Nystatin)  500,000 unit PO TID [facility]


   Last Admin: 06/09/19 07:58 Dose:  Not Given


Pantoprazole Sodium (Protonix Iv)  40 mg IVPUSH DAILY [facility]


   Last Admin: 06/09/19 12:02 Dose:  40 mg


Potassium Phos/Sodium Phos (Phos-Nak Packet -)  1 packet PO DAILY [facility]


   Last Admin: 06/08/19 09:11 Dose:  1 packet


Sodium Chloride (Ocean Spray Nasal Spray -)  2 spray NS TID PRN


   PRN Reason: NASAL CONGESTION


Thiamine HCl (Vitamin B1 Injection -)  200 mg IVPB DAILY [facility]


   Last Admin: 06/09/19 13:22 Dose:  200 mg











- Objective


Vital Signs: 


 Vital Signs











Temperature  90.8 F L  06/09/19 13:00


 


Pulse Rate  72   06/09/19 13:00


 


Respiratory Rate  24 H  06/09/19 13:00


 


Blood Pressure  126/57 L  06/09/19 13:00


 


O2 Sat by Pulse Oximetry (%)  98   06/09/19 09:00











Constitutional: Yes: Well Nourished, No Distress, Calm


Cardiovascular: Yes: Tachycardia


Respiratory: Yes: Mechanically Ventilated


Gastrointestinal: Yes: WNL


Edema: Yes


Edema: LLE: 1+, RLE: 1+


Peripheral Pulses WNL: Yes


Labs: 


 CBC, BMP





 06/09/19 08:19 





 06/09/19 08:19 











Problem List





- Problems


(1) Bronchitis


Code(s): J40 - BRONCHITIS, NOT SPECIFIED AS ACUTE OR CHRONIC   





(2) Cardiopulmonary arrest


Assessment/Plan: 


-ICU monitoring


-mechanical vent





Code(s): I46.9 - CARDIAC ARREST, CAUSE UNSPECIFIED   





(3) Septic shock


Assessment/Plan: 


-ID on board


-IV abx


-Vassopresin


Code(s): A41.9 - SEPSIS, UNSPECIFIED ORGANISM; R65.21 - SEVERE SEPSIS WITH 

SEPTIC SHOCK   





(4) Toxic metabolic encephalopathy


Code(s): G92 - TOXIC ENCEPHALOPATHY

## 2019-06-09 NOTE — RAPID
Physical Examination


Vital Signs: 


 Vital Signs











Temperature  100.2 F H  06/08/19 23:08


 


Pulse Rate  126 H  06/08/19 22:00


 


Respiratory Rate  24 H  06/08/19 22:00


 


Blood Pressure  161/80   06/08/19 22:00


 


O2 Sat by Pulse Oximetry (%)  94 L  06/08/19 21:00











Labs: 


 CBC, BMP





 06/08/19 10:10 





 06/08/19 10:10 











Rapid Response





- Rapid Response


Assessment: 


Code 99 called on the unit. ACLS protocol started. ROSC achieved. Please see 

ACLS sheet for details.

## 2019-06-09 NOTE — CONSULT
Consultation: 





CONSULT REQUEST: We have been asked to medically evaluate this patient for s/p 

cardiac arrest





HISTORY OF PRESENT ILLNESS:





74 year old female with a past medical history of COPD, OA, PreDM2, HTN, HLD 

presented to the hospital for wheezing and dyspnea on exertion and productive 

cough. Given zyrtec by PCP before presenting with no relief. Admitted for acute 

hypoxic respiratory failure 2/2 COPD exacerbation. During her hospital course, 

she was on levaquin and IV steroids. Tonight, patient had a sudden cardiac 

arrest on the floor. ROSC was achieved, after which patient arrested again on 

the floor prior to arriving to the ICU. She received several rounds of 

epinephrine, bicarbonate, and 1 unsynchronized cardioversion. She returned to 

ROSC again and was brought to the ICU. She arrested once again in the ICU,  

requiring peripheral vasopressin to maintain her mean arterial pressures prior 

to inserting a right IJ central line. Peripheral levophed was started.








REVIEW OF SYSTEMS:


unable to assess due to mental status





PHYSICAL EXAMINATION





 Vital Signs - 24 hr











  06/08/19 06/08/19 06/08/19





  09:00 10:00 14:47


 


Temperature  98 F 98.3 F


 


Pulse Rate  115 H 121 H


 


Respiratory 22 H 22 H 22 H





Rate   


 


Blood Pressure  113/75 135/65


 


O2 Sat by Pulse 95  





Oximetry (%)   














  06/08/19 06/08/19 06/08/19





  18:00 21:00 22:00


 


Temperature 97.8 F  99.9 F H


 


Pulse Rate 116 H  126 H


 


Respiratory 20  24 H





Rate   


 


Blood Pressure 147/86  161/80


 


O2 Sat by Pulse  94 L 





Oximetry (%)   














  06/08/19 06/09/19 06/09/19





  23:08 05:30 05:45


 


Temperature 100.2 F H  


 


Pulse Rate  150 H 111 H


 


Respiratory  15 16





Rate   


 


Blood Pressure  148/58 L 99/51 L


 


O2 Sat by Pulse   





Oximetry (%)   














  06/09/19





  06:00


 


Temperature 


 


Pulse Rate 112 H


 


Respiratory 18





Rate 


 


Blood Pressure 125/50 L


 


O2 Sat by Pulse 





Oximetry (%) 














GENERAL: A&Ox0, intubated and sedated


EYES: PERRL


ENT: Moist mucus membranes, intubated


NECK: No JVD


LUNGS: Coarse breath sounds


HEART: tachycardic, mild systolic murmur noted on exam


ABDOMEN: Obese, soft and nontender


EXTREMITIES: 1+ edema


NEUROLOGICAL:  unable to assess due to mental status











 Laboratory Results - last 24 hr











  06/08/19 06/08/19 06/08/19





  06:41 10:10 10:10


 


WBC   11.9 H 


 


RBC   3.67 


 


Hgb   11.9 


 


Hct   35.1 


 


MCV   95.8 


 


MCH   32.3 


 


MCHC   33.7 


 


RDW   12.7 


 


Plt Count   445 H D 


 


MPV   7.3 L 


 


Absolute Neuts (auto)   9.9 H 


 


Neutrophils %   83.4 H 


 


Neutrophils % (Manual)   88.9 H 


 


Band Neutrophils %   0.0 


 


Lymphocytes %   6.5 L 


 


Lymphocytes % (Manual)   6.1 L 


 


Monocytes %   10.0 


 


Monocytes % (Manual)   5 


 


Eosinophils %   0.0  D 


 


Eosinophils % (Manual)   0.0 


 


Basophils %   0.1 


 


Basophils % (Manual)   0.0 


 


Myelocytes % (Man)   0 


 


Promyelocytes % (Man)   0 


 


Blast Cells % (Manual)   0 


 


Nucleated RBC %   0 


 


Metamyelocytes   0 


 


Hypochromia   0 


 


Platelet Estimate   Normal 


 


Polychromasia   1+ 


 


Poikilocytosis   0 


 


Anisocytosis   1+ 


 


Microcytosis   0 


 


Macrocytosis   1+ 


 


Sodium    135 L


 


Potassium    3.5


 


Chloride    96 L


 


Carbon Dioxide    31


 


Anion Gap    8


 


BUN    24.9 H


 


Creatinine    0.9


 


Est GFR (CKD-EPI)AfAm    73.52


 


Est GFR (CKD-EPI)NonAf    63.43


 


POC Glucometer  128  


 


Random Glucose    178 H


 


Hemoglobin A1c %   


 


Lactic Acid   


 


Calcium    9.0


 


Total Bilirubin    0.5


 


AST    43 H


 


ALT    52


 


Alkaline Phosphatase    69


 


Total Protein    6.2 L


 


Albumin    3.3 L














  06/08/19 06/08/19 06/08/19





  10:10 11:18 16:32


 


WBC   


 


RBC   


 


Hgb   


 


Hct   


 


MCV   


 


MCH   


 


MCHC   


 


RDW   


 


Plt Count   


 


MPV   


 


Absolute Neuts (auto)   


 


Neutrophils %   


 


Neutrophils % (Manual)   


 


Band Neutrophils %   


 


Lymphocytes %   


 


Lymphocytes % (Manual)   


 


Monocytes %   


 


Monocytes % (Manual)   


 


Eosinophils %   


 


Eosinophils % (Manual)   


 


Basophils %   


 


Basophils % (Manual)   


 


Myelocytes % (Man)   


 


Promyelocytes % (Man)   


 


Blast Cells % (Manual)   


 


Nucleated RBC %   


 


Metamyelocytes   


 


Hypochromia   


 


Platelet Estimate   


 


Polychromasia   


 


Poikilocytosis   


 


Anisocytosis   


 


Microcytosis   


 


Macrocytosis   


 


Sodium   


 


Potassium   


 


Chloride   


 


Carbon Dioxide   


 


Anion Gap   


 


BUN   


 


Creatinine   


 


Est GFR (CKD-EPI)AfAm   


 


Est GFR (CKD-EPI)NonAf   


 


POC Glucometer   219  173


 


Random Glucose   


 


Hemoglobin A1c %  6.4 H  


 


Lactic Acid   


 


Calcium   


 


Total Bilirubin   


 


AST   


 


ALT   


 


Alkaline Phosphatase   


 


Total Protein   


 


Albumin   














  06/08/19 06/09/19





  22:15 05:30


 


WBC  


 


RBC  


 


Hgb  


 


Hct  


 


MCV  


 


MCH  


 


MCHC  


 


RDW  


 


Plt Count  


 


MPV  


 


Absolute Neuts (auto)  


 


Neutrophils %  


 


Neutrophils % (Manual)  


 


Band Neutrophils %  


 


Lymphocytes %  


 


Lymphocytes % (Manual)  


 


Monocytes %  


 


Monocytes % (Manual)  


 


Eosinophils %  


 


Eosinophils % (Manual)  


 


Basophils %  


 


Basophils % (Manual)  


 


Myelocytes % (Man)  


 


Promyelocytes % (Man)  


 


Blast Cells % (Manual)  


 


Nucleated RBC %  


 


Metamyelocytes  


 


Hypochromia  


 


Platelet Estimate  


 


Polychromasia  


 


Poikilocytosis  


 


Anisocytosis  


 


Microcytosis  


 


Macrocytosis  


 


Sodium  


 


Potassium  


 


Chloride  


 


Carbon Dioxide  


 


Anion Gap  


 


BUN  


 


Creatinine  


 


Est GFR (CKD-EPI)AfAm  


 


Est GFR (CKD-EPI)NonAf  


 


POC Glucometer  138 


 


Random Glucose  


 


Hemoglobin A1c %  


 


Lactic Acid   9.4 H*


 


Calcium  


 


Total Bilirubin  


 


AST  


 


ALT  


 


Alkaline Phosphatase  


 


Total Protein  


 


Albumin  








Active Medications











Generic Name Dose Route Start Last Admin





  Trade Name Freq  PRN Reason Stop Dose Admin


 


Acetaminophen  650 mg  06/05/19 12:49  06/05/19 13:40





  Tylenol -  PO   650 mg





  Q8H PRN   Administration





  PAIN LEVEL 1-5   





     





     





     


 


Albuterol Sulfate  1 amp  06/06/19 10:27  





  Ventolin 0.083% Nebulizer Soln -  NEB   





  Q4H PRN   





  SHORT OF BREATH/WHEEZING   





     





     





     


 


Albuterol/Ipratropium  1 amp  06/04/19 08:00  06/08/19 21:30





  Duoneb -  NEB   1 amp





  RQID [facility]ORIE   Administration





     





     





     





     


 


Aspirin  81 mg  06/04/19 10:00  06/08/19 09:11





  Asa -  PO   81 mg





  DAILY [facility]ORIE   Administration





     





     





     





     


 


Atorvastatin Calcium  40 mg  06/03/19 22:00  06/08/19 22:18





  Lipitor -  PO   40 mg





  HS [facility]OR[facility]   Administration





     





     





     





     


 


Benzocaine/Menthol  1 each  06/04/19 18:19  06/05/19 09:19





  Cepacol Lozenge -  MM   1 each





  Q2H PRN   Administration





  SORE THROAT   





     





     





     


 


Cyanocobalamin  1,000 mcg  06/05/19 10:45  06/08/19 09:14





  Vitamin B12 Injection -  IM  06/11/19 10:01  1,000 mcg





  DAILY DORIE   Administration





     





     





     





     


 


Fluticasone Propionate  1 spray  06/05/19 22:00  06/08/19 22:18





  Flonase -  NS   Not Given





  BID [facility]ORIE   





     





     





     





     


 


Folic Acid  1 mg  06/05/19 10:45  06/08/19 09:12





  Folic Acid -  PO   1 mg





  DAILY DORIE   Administration





     





     





     





     


 


Guaifenesin  10 ml  06/03/19 21:29  06/08/19 14:30





  Diabetic Tussin Dm -  PO   10 ml





  Q6H PRN   Administration





  COUGH   





     





     





     


 


Heparin Sodium (Porcine)  5,000 unit  06/03/19 22:00  06/08/19 22:18





  Heparin -  SQ   5,000 unit





  BID DORIE   Administration





     





     





     





     


 


Ceftriaxone Sodium 1 gm/  50 mls @ 200 mls/hr  06/04/19 10:00  06/08/19 09:11





  Dextrose  IVPB   200 mls/hr





  DAILY DORIE   Administration





     





     





  Protocol   





     


 


Vasopressin 50 units/ Sodium  100 mls @ 4 mls/hr  06/09/19 05:30  





  Chloride  IVPB   





  ASDIR [facility]OR[facility]   





     





     





  Protocol   





  2 UNITS/HR   


 


Norepinephrine Bitartrate 8,  500 mls @ 18.75 mls/hr  06/09/19 06:30  





  000 mcg/ Dextrose  IV   





  TITR DORIE   





     





     





  Protocol   





  5 MCG/MIN   


 


Insulin Aspart  1 vial  06/03/19 22:00  06/08/19 22:17





  Novolog Vial Sliding Scale -  SQ   Not Given





  [facility]   





     





     





  Protocol   





     


 


Loratadine  10 mg  06/05/19 15:30  06/08/19 09:13





  Claritin -  PO   10 mg





  DAILY DORIE   Administration





     





     





     





     


 


Methimazole  5 mg  06/07/19 22:00  06/08/19 22:18





  Tapazole -  PO   5 mg





  BID DORIE   Administration





     





     





     





     


 


Methylprednisolone Sodium Succinate  20 mg  06/09/19 10:00  





  Solu-Medrol -  IVPUSH   





  DAILY DORIE   





     





     





     





     


 


Metoprolol Succinate  12.5 mg  06/04/19 10:00  06/08/19 09:12





  Toprol Xl -  PO   12.5 mg





  DAILY DORIE   Administration





     





     





     





     


 


Nystatin  500,000 unit  06/07/19 22:00  06/08/19 22:18





  Nystatin  PO   500,000 unit





  TID DORIE   Administration





     





     





     





     


 


Olanzapine  10 mg  06/07/19 22:00  06/08/19 22:18





  Zyprexa -  PO   10 mg





  HS DORIE   Administration





     





     





     





     


 


Potassium Phos/Sodium Phos  1 packet  06/07/19 10:00  06/08/19 09:11





  Phos-Nak Packet -  PO   1 packet





  DAILY DORIE   Administration





     





     





     





     


 


Sodium Chloride  2 spray  06/05/19 15:21  





  Ocean Spray Nasal Spray -  NS   





  TID PRN   





  NASAL CONGESTION   





     





     





     


 


Thiamine HCl  200 mg  06/05/19 10:45  06/08/19 11:13





  Vitamin B1 Injection -  IVPB   200 mg





  DAILY DORIE   Administration





     





     





     





     











ASSESSMENT/PLAN:





74 year old female with a past medical history of COPD, OA, PreDM2, HTN, HLD 

presented to the hospital for wheezing and dyspnea on exertion and productive 

cough admitted for acute on chronic hypoxic respiratory respiratory failure 2/2 

COPD exacerbation. S/P cardiac arrest





#Cardiovascular


-patient is s/p cardiac arrest. Unclear etiology at present time


-patient had 3 episodes of cardiac arrest between the following times:


   456-503am


   518-524am


   531-533am


-ROSC was achieved. Patient is currently on levophed 10 and vasopressin 6 and 

maintaining a MAP of 75


-differentials include pulmonary embolism, myocardial infarction, acute 

arrhythmia, hypoxia, or acidosis


-due to high clinical suspicion of pulmonary embolism (immobility, tachypnea, 

hypoxia, sinus tachycardia), will empirically treat with heparin ggt


-troponins were elevated at 0.17, repeat


-EKG prior to the code showed sinus tachycardia. Will repeat EKG now


-duplex lower extremity ultrasounds


-patient is on fluids


-echocardiogram ordered


-cardiology consultation


-therapeutic hypothermia


-lactic acid 9.4, repeat





#Neurological


-Currently not alert or oriented and only slightly responsive without sedation





#Pulmonary


-intubated and not yet sedated


-repeat CXR


-vent settings RR 15, , FI02 100%, PEEP 5


-hold steroids


-repeat ABG





#Gastrointestinal


-severe elevation in transaminases likely 2/2 shock liver due to hypoperfusion 

of liver





#Renal


-slight THOMPSON with a creatinine of 1.6, likely 2/2 hypoperfusion


-patient is receiving fluids


-will hydrate and recheck a BMP





#Infectious Disease


-WBC increased to 18.1 s/p arrest, likely reactive, can continue to monitor


-ABG directly post arrest was 7.22, pCO2 56.2, HCO3 22.2





#Prophylaxis


-on heparin ggt for high suspicion of PE





Dispo: We will continue to follow the patient. Thank you for this consultative 

opportunity.











Visit type





- Emergency Visit


Emergency Visit: No





- New Patient


This patient is new to me today: No





- Critical Care


Critical Care patient: Yes


Total Critical Care Time (in minutes): 80


Critical Care Statement: The care of this patient involved high complexity 

decision making to prevent further life threatening deterioration of the patient

's condition and/or to evaluate & treat vital organ system(s) failure or risk 

of failure.

## 2019-06-09 NOTE — PN
Progress Note, Physician


Chief Complaint: 





intubated /daughter at bedside


History of Present Illness: 





sp respiratory failure,intubated,sedated possible mi vs pe,iv pressors,iv fluids

,critical state








- Current Medication List


Current Medications: 


Active Medications





Acetaminophen (Tylenol -)  650 mg PO Q8H PRN


   PRN Reason: PAIN LEVEL 1-5


   Last Admin: 06/05/19 13:40 Dose:  650 mg


Albuterol Sulfate (Ventolin 0.083% Nebulizer Soln -)  1 amp NEB Q4H PRN


   PRN Reason: SHORT OF BREATH/WHEEZING


Aspirin (Asa -)  81 mg PO DAILY DORIE


   Last Admin: 06/09/19 16:03 Dose:  Not Given


Atorvastatin Calcium (Lipitor -)  40 mg PO HS DORIE


   Last Admin: 06/08/19 22:18 Dose:  40 mg


Benzocaine/Menthol (Cepacol Lozenge -)  1 each MM Q2H PRN


   PRN Reason: SORE THROAT


   Last Admin: 06/05/19 09:19 Dose:  1 each


Cyanocobalamin (Vitamin B12 Injection -)  1,000 mcg IM DAILY DORIE


   Stop: 06/11/19 10:01


   Last Admin: 06/09/19 13:19 Dose:  1,000 mcg


Fluticasone Propionate (Flonase -)  1 spray NS BID DORIE


   Last Admin: 06/09/19 11:00 Dose:  Not Given


Folic Acid (Folic Acid -)  1 mg PO DAILY DORIE


   Last Admin: 06/09/19 16:03 Dose:  Not Given


Guaifenesin (Diabetic Tussin Dm -)  10 ml PO Q6H PRN


   PRN Reason: COUGH


   Last Admin: 06/08/19 14:30 Dose:  10 ml


Heparin Sodium (Porcine) (Heparin -)  3,500 unit 40 unit/kg (3500 unit) IVPUSH 

PRN PRN


   PRN Reason: For aPTT 35 to 45 seconds


Heparin Sodium (Porcine) (Heparin -)  7,000 unit 80 unit/kg (7000 unit) IVPUSH 

PRN PRN


   PRN Reason: aPTT <35 seconds


Hydrocortisone Sodium Succinate (Solu-Cortef -)  100 mg IVPUSH Q8H-IV DORIE


   Last Admin: 06/09/19 18:02 Dose:  100 mg


Vasopressin 50 units/ Sodium (Chloride)  100 mls @ 4 mls/hr IVPB ASDIR DORIE; 

Protocol


   Last Titration: 06/09/19 18:15 Dose:  3 units/hr, 6 mls/hr


Norepinephrine Bitartrate 8, (000 mcg/ Dextrose)  500 mls @ 18.75 mls/hr IV 

TITR DORIE; Protocol


   Last Titration: 06/09/19 18:13 Dose:  5 mcg/min, 18.75 mls/hr


Heparin Sodium/Dextrose (Heparin Infusion -)  25,000 units in 500 mls @ 31.515 

mls/hr IVPB TITR DORIE; Protocol


   Last Titration: 06/09/19 18:44 Dose:  15 units/kg/hr, 26.263 mls/hr


Midazolam HCl (Midazolam 100mg/100ml-0.9%Nacl)  100 mg in 100 mls @ 1 mls/hr 

IVPB TITR DORIE; Protocol


   Last Infusion: 06/09/19 15:00 Dose:  6 mg/hr, 6 mls/hr


Vancomycin HCl (Vancomycin (Pre-Docked))  1,000 mg in 250 mls @ 166.667 mls/hr 

IVPB Q24H DORIE; Protocol


   Last Admin: 06/09/19 11:43 Dose:  Not Given


Meropenem 500 mg/ Dextrose  100 mls @ 200 mls/hr IVPB Q8H-IV DORIE


   Last Admin: 06/09/19 17:24 Dose:  200 mls/hr


Amiodarone HCl/Dextrose (Nexterone 360 Mg/200 Ml Bag)  360 mg in 200 mls @ 

16.667 mls/hr IVPB ASDIR DORIE; Protocol


   Last Admin: 06/09/19 16:02 Dose:  16.667 mls/hr


Fentanyl 500 mcg/ Dextrose  100 mls @ 10 mls/hr IVPB TITR DORIE; Protocol


   Last Admin: 06/09/19 18:04 Dose:  50 mcg/hr, 10 mls/hr


Insulin Aspart (Novolog Vial Sliding Scale -)  1 vial SQ Q4HPO DORIE; Protocol


   Last Admin: 06/09/19 17:35 Dose:  Not Given


Loratadine (Claritin -)  10 mg PO DAILY [facility]


   Last Admin: 06/09/19 16:03 Dose:  Not Given


Methimazole (Tapazole -)  5 mg PO BID [facility]


   Last Admin: 06/09/19 16:03 Dose:  Not Given


Metoprolol Succinate (Toprol Xl -)  12.5 mg PO DAILY [facility]


   Last Admin: 06/08/19 09:12 Dose:  12.5 mg


Nystatin (Nystatin)  500,000 unit PO TID [facility]


   Last Admin: 06/09/19 15:42 Dose:  Not Given


Pantoprazole Sodium (Protonix Iv)  40 mg IVPUSH DAILY [facility]


   Last Admin: 06/09/19 12:02 Dose:  40 mg


Potassium Phos/Sodium Phos (Phos-Nak Packet -)  1 packet PO DAILY [facility]


   Last Admin: 06/09/19 16:03 Dose:  Not Given


Sodium Chloride (Ocean Spray Nasal Spray -)  2 spray NS TID PRN


   PRN Reason: NASAL CONGESTION


Thiamine HCl (Vitamin B1 Injection -)  200 mg IVPB DAILY [facility]


   Last Admin: 06/09/19 13:22 Dose:  200 mg











- Objective


Vital Signs: 


 Vital Signs











Temperature  92.8 F L  06/09/19 18:48


 


Pulse Rate  59 L  06/09/19 18:48


 


Respiratory Rate  24 H  06/09/19 19:30


 


Blood Pressure  123/75   06/09/19 18:48


 


O2 Sat by Pulse Oximetry (%)  96   06/09/19 16:13











Constitutional: Yes: Other (sedated)


Neck: Yes: Trachea Midline


Cardiovascular: Yes: Bradycardia


Gastrointestinal: Yes: Normal Bowel Sounds, Soft


...Rectal Exam: Yes: Deferred


Musculoskeletal: Yes: WNL


Extremities: Yes: WNL


Edema: No


Neurological: Yes: Unresponsive


Labs: 


 CBC, BMP





 06/09/19 08:19 





 06/09/19 08:19 











Problem List





- Problems


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


Code(s): E05.00 - THYROTOXICOSIS W DIFFUSE GOITER W/O THYROTOXIC CRISIS   





(2) Altered mental status


Code(s): R41.82 - ALTERED MENTAL STATUS, UNSPECIFIED   





(3) HLD (hyperlipidemia)


Code(s): E78.5 - HYPERLIPIDEMIA, UNSPECIFIED   





(4) HTN (hypertension)


Code(s): I10 - ESSENTIAL (PRIMARY) HYPERTENSION   





(5) Prediabetes


Code(s): R73.03 - PREDIABETES   





Assessment/Plan





Current Active Problems





THOMPSON (acute kidney injury) (Acute)


Altered mental status (Acute)


Bronchitis (Acute)


COPD exacerbation (Acute)


Cough (Acute)


HLD (hyperlipidemia) (Acute)


HTN (hypertension) (Acute)


Prediabetes (Acute)


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


Toxic metabolic encephalopathy (Acute)





 Laboratory Results - last 24 hr











  06/08/19 06/08/19 06/09/19





  10:10 22:15 05:30


 


WBC    18.1 H


 


RBC    3.22 L


 


Hgb    10.6 L


 


Hct    31.4 L


 


MCV    97.6 H


 


MCH    32.9


 


MCHC    33.7


 


RDW    13.0


 


Plt Count  445 H D   248  D


 


MPV    7.4 L


 


Absolute Neuts (auto)    14.4 H


 


Neutrophils %    79.4


 


Neutrophils % (Manual)    67.3


 


Band Neutrophils %    1.0


 


Lymphocytes %    16.2  D


 


Lymphocytes % (Manual)    16.8  D


 


Monocytes %    4.1


 


Monocytes % (Manual)    4


 


Eosinophils %    0.1  D


 


Eosinophils % (Manual)    0.0


 


Basophils %    0.2


 


Basophils % (Manual)    0.0


 


Myelocytes % (Man)    0


 


Promyelocytes % (Man)    0


 


Blast Cells % (Manual)    0


 


Nucleated RBC %    1 H


 


Metamyelocytes    3 H D


 


Hypochromia    0


 


Platelet Estimate    Normal


 


Polychromasia    2+


 


Poikilocytosis    1+


 


Anisocytosis    2+


 


Microcytosis    1+


 


Macrocytosis    0


 


Spherocytes    1+


 


Tear Drop Cells    1+


 


Josep Cells    1+


 


Schistocytes    1+


 


PTT (Actin FS)   


 


Puncture Site   


 


ABG pH   


 


ABG pCO2 at Pt Temp   


 


ABG pO2 at Pt Temp   


 


ABG HCO3   


 


ABG O2 Sat (Measured)   


 


ABG O2 Content   


 


ABG Base Excess   


 


Dylan Test   


 


O2 Delivery Device   


 


Oxygen Flow Rate   


 


Vent Mode   


 


Vent Rate   


 


Mechanical Rate   


 


PEEP   


 


Pressure Support Vent   


 


Sodium   


 


Potassium   


 


Chloride   


 


Carbon Dioxide   


 


Anion Gap   


 


BUN   


 


Creatinine   


 


Est GFR (CKD-EPI)AfAm   


 


Est GFR (CKD-EPI)NonAf   


 


POC Glucometer   138 


 


Random Glucose   


 


Lactic Acid   


 


Calcium   


 


Phosphorus   


 


Magnesium   


 


Total Bilirubin   


 


AST   


 


ALT   


 


Alkaline Phosphatase   


 


Creatine Kinase   


 


Creatine Kinase Index   


 


CK-MB (CK-2)   


 


Troponin I   


 


Total Protein   


 


Albumin   














  06/09/19 06/09/19 06/09/19





  05:30 05:30 06:40


 


WBC   


 


RBC   


 


Hgb   


 


Hct   


 


MCV   


 


MCH   


 


MCHC   


 


RDW   


 


Plt Count   


 


MPV   


 


Absolute Neuts (auto)   


 


Neutrophils %   


 


Neutrophils % (Manual)   


 


Band Neutrophils %   


 


Lymphocytes %   


 


Lymphocytes % (Manual)   


 


Monocytes %   


 


Monocytes % (Manual)   


 


Eosinophils %   


 


Eosinophils % (Manual)   


 


Basophils %   


 


Basophils % (Manual)   


 


Myelocytes % (Man)   


 


Promyelocytes % (Man)   


 


Blast Cells % (Manual)   


 


Nucleated RBC %   


 


Metamyelocytes   


 


Hypochromia   


 


Platelet Estimate   


 


Polychromasia   


 


Poikilocytosis   


 


Anisocytosis   


 


Microcytosis   


 


Macrocytosis   


 


Spherocytes   


 


Tear Drop Cells   


 


Josep Cells   


 


Schistocytes   


 


PTT (Actin FS)   


 


Puncture Site    Right radial


 


ABG pH    7.22 L


 


ABG pCO2 at Pt Temp    56.2 H


 


ABG pO2 at Pt Temp    305 H


 


ABG HCO3    22.2


 


ABG O2 Sat (Measured)    99.5 H


 


ABG O2 Content    13.8 L


 


ABG Base Excess    -5.2 L


 


Dylan Test    Positive


 


O2 Delivery Device    Vent


 


Oxygen Flow Rate    100%


 


Vent Mode    A/c


 


Vent Rate    15


 


Mechanical Rate    Yes


 


PEEP    5.0


 


Pressure Support Vent    450


 


Sodium  139  


 


Potassium  4.6  


 


Chloride  99  


 


Carbon Dioxide  24  


 


Anion Gap  16  


 


BUN  28.7 H  


 


Creatinine  1.6 H  


 


Est GFR (CKD-EPI)AfAm  36.67  


 


Est GFR (CKD-EPI)NonAf  31.64  


 


POC Glucometer   


 


Random Glucose  180 H  


 


Lactic Acid   9.4 H* 


 


Calcium  7.8 L  


 


Phosphorus   


 


Magnesium   


 


Total Bilirubin  0.8  


 


AST  4073 H  


 


ALT  3965 H  


 


Alkaline Phosphatase  66  


 


Creatine Kinase   


 


Creatine Kinase Index   


 


CK-MB (CK-2)   


 


Troponin I  0.17 H  


 


Total Protein  4.9 L  


 


Albumin  2.4 L  














  06/09/19 06/09/19 06/09/19





  08:19 08:19 08:19


 


WBC  27.1 H  


 


RBC  3.39 L  


 


Hgb  10.8  


 


Hct  32.5  


 


MCV  95.9  


 


MCH  32.0  


 


MCHC  33.3  


 


RDW  12.9  


 


Plt Count  272  


 


MPV  7.1 L  


 


Absolute Neuts (auto)   


 


Neutrophils %   


 


Neutrophils % (Manual)   


 


Band Neutrophils %   


 


Lymphocytes %   


 


Lymphocytes % (Manual)   


 


Monocytes %   


 


Monocytes % (Manual)   


 


Eosinophils %   


 


Eosinophils % (Manual)   


 


Basophils %   


 


Basophils % (Manual)   


 


Myelocytes % (Man)   


 


Promyelocytes % (Man)   


 


Blast Cells % (Manual)   


 


Nucleated RBC %   


 


Metamyelocytes   


 


Hypochromia   


 


Platelet Estimate   


 


Polychromasia   


 


Poikilocytosis   


 


Anisocytosis   


 


Microcytosis   


 


Macrocytosis   


 


Spherocytes   


 


Tear Drop Cells   


 


Josep Cells   


 


Schistocytes   


 


PTT (Actin FS)   


 


Puncture Site   


 


ABG pH   


 


ABG pCO2 at Pt Temp   


 


ABG pO2 at Pt Temp   


 


ABG HCO3   


 


ABG O2 Sat (Measured)   


 


ABG O2 Content   


 


ABG Base Excess   


 


Dylan Test   


 


O2 Delivery Device   


 


Oxygen Flow Rate   


 


Vent Mode   


 


Vent Rate   


 


Mechanical Rate   


 


PEEP   


 


Pressure Support Vent   


 


Sodium   139 


 


Potassium   4.2 


 


Chloride   102 


 


Carbon Dioxide   27 


 


Anion Gap   11 


 


BUN   28.6 H 


 


Creatinine   1.4 H 


 


Est GFR (CKD-EPI)AfAm   43.09 


 


Est GFR (CKD-EPI)NonAf   37.18 


 


POC Glucometer   


 


Random Glucose   207 H 


 


Lactic Acid    4.6 H*


 


Calcium   7.3 L 


 


Phosphorus   7.0 H 


 


Magnesium   2.2 


 


Total Bilirubin   0.9 


 


AST   5550 H 


 


ALT   5368 H 


 


Alkaline Phosphatase   71 


 


Creatine Kinase   


 


Creatine Kinase Index   


 


CK-MB (CK-2)   


 


Troponin I   0.83 H* 


 


Total Protein   4.9 L 


 


Albumin   2.4 L 














  06/09/19 06/09/19 06/09/19





  08:50 11:58 14:45


 


WBC   


 


RBC   


 


Hgb   


 


Hct   


 


MCV   


 


MCH   


 


MCHC   


 


RDW   


 


Plt Count   


 


MPV   


 


Absolute Neuts (auto)   


 


Neutrophils %   


 


Neutrophils % (Manual)   


 


Band Neutrophils %   


 


Lymphocytes %   


 


Lymphocytes % (Manual)   


 


Monocytes %   


 


Monocytes % (Manual)   


 


Eosinophils %   


 


Eosinophils % (Manual)   


 


Basophils %   


 


Basophils % (Manual)   


 


Myelocytes % (Man)   


 


Promyelocytes % (Man)   


 


Blast Cells % (Manual)   


 


Nucleated RBC %   


 


Metamyelocytes   


 


Hypochromia   


 


Platelet Estimate   


 


Polychromasia   


 


Poikilocytosis   


 


Anisocytosis   


 


Microcytosis   


 


Macrocytosis   


 


Spherocytes   


 


Tear Drop Cells   


 


[place] Cells   


 


Schistocytes   


 


PTT (Actin FS)   


 


Puncture Site  Right radial  


 


ABG pH  7.30 L  


 


ABG pCO2 at Pt Temp  52.2 H  


 


ABG pO2 at Pt Temp  211 H  


 


ABG HCO3  25.1  


 


ABG O2 Sat (Measured)  99.5 H  


 


ABG O2 Content  15.2  


 


ABG Base Excess  -1.2  


 


Dylan Test  Positive  


 


O2 Delivery Device  Vent  


 


Oxygen Flow Rate  100%  


 


Vent Mode  A/c  


 


Vent Rate  15  


 


Mechanical Rate  Yes  


 


PEEP  5.0  


 


Pressure Support Vent  450  


 


Sodium   


 


Potassium   


 


Chloride   


 


Carbon Dioxide   


 


Anion Gap   


 


BUN   


 


Creatinine   


 


Est GFR (CKD-EPI)AfAm   


 


Est GFR (CKD-EPI)NonAf   


 


POC Glucometer   300 


 


Random Glucose   


 


Lactic Acid   


 


Calcium   


 


Phosphorus   


 


Magnesium   


 


Total Bilirubin   


 


AST   


 


ALT   


 


Alkaline Phosphatase   


 


Creatine Kinase    1622 H


 


Creatine Kinase Index    2.5


 


CK-MB (CK-2)    41.4 H


 


Troponin I    1.78 H*


 


Total Protein   


 


Albumin   














  06/09/19 06/09/19





  14:45 17:29


 


WBC  


 


RBC  


 


Hgb  


 


Hct  


 


MCV  


 


MCH  


 


MCHC  


 


RDW  


 


Plt Count  


 


MPV  


 


Absolute Neuts (auto)  


 


Neutrophils %  


 


Neutrophils % (Manual)  


 


Band Neutrophils %  


 


Lymphocytes %  


 


Lymphocytes % (Manual)  


 


Monocytes %  


 


Monocytes % (Manual)  


 


Eosinophils %  


 


Eosinophils % (Manual)  


 


Basophils %  


 


Basophils % (Manual)  


 


Myelocytes % (Man)  


 


Promyelocytes % (Man)  


 


Blast Cells % (Manual)  


 


Nucleated RBC %  


 


Metamyelocytes  


 


Hypochromia  


 


Platelet Estimate  


 


Polychromasia  


 


Poikilocytosis  


 


Anisocytosis  


 


Microcytosis  


 


Macrocytosis  


 


Spherocytes  


 


Tear Drop Cells  


 


[place] Cells  


 


Schistocytes  


 


PTT (Actin FS)  133.7 H 


 


Puncture Site  


 


ABG pH  


 


ABG pCO2 at Pt Temp  


 


ABG pO2 at Pt Temp  


 


ABG HCO3  


 


ABG O2 Sat (Measured)  


 


ABG O2 Content  


 


ABG Base Excess  


 


Dylan Test  


 


O2 Delivery Device  


 


Oxygen Flow Rate  


 


Vent Mode  


 


Vent Rate  


 


Mechanical Rate  


 


PEEP  


 


Pressure Support Vent  


 


Sodium  


 


Potassium  


 


Chloride  


 


Carbon Dioxide  


 


Anion Gap  


 


BUN  


 


Creatinine  


 


Est GFR (CKD-EPI)AfAm  


 


Est GFR (CKD-EPI)NonAf  


 


POC Glucometer   482


 


Random Glucose  


 


Lactic Acid  


 


Calcium  


 


Phosphorus  


 


Magnesium  


 


Total Bilirubin  


 


AST  


 


ALT  


 


Alkaline Phosphatase  


 


Creatine Kinase  


 


Creatine Kinase Index  


 


CK-MB (CK-2)  


 


Troponin I  


 


Total Protein  


 


Albumin  








 Abnormal Lab Results











  06/08/19 06/09/19 06/09/19





  10:10 05:30 05:30


 


WBC   18.1 H 


 


RBC   3.22 L 


 


Hgb   10.6 L 


 


Hct   31.4 L 


 


MCV   97.6 H 


 


Plt Count  445 H D  


 


MPV   7.4 L 


 


Absolute Neuts (auto)   14.4 H 


 


Nucleated RBC %   1 H 


 


Metamyelocytes   3 H D 


 


PTT (Actin FS)   


 


ABG pH   


 


ABG pCO2 at Pt Temp   


 


ABG pO2 at Pt Temp   


 


ABG O2 Sat (Measured)   


 


ABG O2 Content   


 


ABG Base Excess   


 


BUN    28.7 H


 


Creatinine    1.6 H


 


Random Glucose    180 H


 


Lactic Acid   


 


Calcium    7.8 L


 


Phosphorus   


 


AST    4073 H


 


ALT    3965 H


 


Creatine Kinase   


 


CK-MB (CK-2)   


 


Troponin I    0.17 H


 


Total Protein    4.9 L


 


Albumin    2.4 L














  06/09/19 06/09/19 06/09/19





  05:30 06:40 08:19


 


WBC    27.1 H


 


RBC    3.39 L


 


Hgb   


 


Hct   


 


MCV   


 


Plt Count   


 


MPV    7.1 L


 


Absolute Neuts (auto)   


 


Nucleated RBC %   


 


Metamyelocytes   


 


PTT (Actin FS)   


 


ABG pH   7.22 L 


 


ABG pCO2 at Pt Temp   56.2 H 


 


ABG pO2 at Pt Temp   305 H 


 


ABG O2 Sat (Measured)   99.5 H 


 


ABG O2 Content   13.8 L 


 


ABG Base Excess   -5.2 L 


 


BUN   


 


Creatinine   


 


Random Glucose   


 


Lactic Acid  9.4 H*  


 


Calcium   


 


Phosphorus   


 


AST   


 


ALT   


 


Creatine Kinase   


 


CK-MB (CK-2)   


 


Troponin I   


 


Total Protein   


 


Albumin   














  06/09/19 06/09/19 06/09/19





  08:19 08:19 08:50


 


WBC   


 


RBC   


 


Hgb   


 


Hct   


 


MCV   


 


Plt Count   


 


MPV   


 


Absolute Neuts (auto)   


 


Nucleated RBC %   


 


Metamyelocytes   


 


PTT (Actin FS)   


 


ABG pH    7.30 L


 


ABG pCO2 at Pt Temp    52.2 H


 


ABG pO2 at Pt Temp    211 H


 


ABG O2 Sat (Measured)    99.5 H


 


ABG O2 Content   


 


ABG Base Excess   


 


BUN  28.6 H  


 


Creatinine  1.4 H  


 


Random Glucose  207 H  


 


Lactic Acid   4.6 H* 


 


Calcium  7.3 L  


 


Phosphorus  7.0 H  


 


AST  5550 H  


 


ALT  5368 H  


 


Creatine Kinase   


 


CK-MB (CK-2)   


 


Troponin I  0.83 H*  


 


Total Protein  4.9 L  


 


Albumin  2.4 L  














  06/09/19 06/09/19





  14:45 14:45


 


WBC  


 


RBC  


 


Hgb  


 


Hct  


 


MCV  


 


Plt Count  


 


MPV  


 


Absolute Neuts (auto)  


 


Nucleated RBC %  


 


Metamyelocytes  


 


PTT (Actin FS)   133.7 H


 


ABG pH  


 


ABG pCO2 at Pt Temp  


 


ABG pO2 at Pt Temp  


 


ABG O2 Sat (Measured)  


 


ABG O2 Content  


 


ABG Base Excess  


 


BUN  


 


Creatinine  


 


Random Glucose  


 


Lactic Acid  


 


Calcium  


 


Phosphorus  


 


AST  


 


ALT  


 


Creatine Kinase  1622 H 


 


CK-MB (CK-2)  41.4 H 


 


Troponin I  1.78 H* 


 


Total Protein  


 


Albumin  








plan;


tapazole 5mg bid


repeat tsh free t4

## 2019-06-09 NOTE — CONS
DATE OF CONSULTATION:  

 

DATE OF DICTATION:  06/09/2019

 

INFECTIOUS DISEASE CONSULTATION 

 

HISTORY OF PRESENT ILLNESS:  The patient is a 74-year-old female evaluated for septic

shock.  History was obtained from the chart, as she cannot give a history. The

patient was admitted to the hospital on Janina 3, 2019, with a 4-day history of cough

productive of yellowish sputum, shortness of breath, and chest discomfort.  She was

admitted with a diagnosis of acute exacerbation COPD and bronchitis.  CAT scan of the

chest was negative for acute infiltrate.  Her hospital course was complicated by

agitation.  On the evening of June 8, 2019, she was noted to be febrile and

tachycardic.  Early this morning a rapid response was called, and patient was noted

to be in pulmonary arrest.  She was successfully resuscitated and transferred to the

intensive care unit.  At the present time she is unresponsive on the ventilator,

hypotensive on pressors.  

 

The patient was placed on hypothermia protocol.  Patient was empirically treated with

ceftriaxone and vancomycin.  

 

Presently she is unresponsive on the ventilator.  

 

PAST MEDICAL HISTORY:  Positive for COPD, asthma, osteoarthritis, hypertension,

hyperlipidemia, questionable allergy to AMOXICILLIN according to a previous note;

however, this is not confirmed.  

 

MEDICATION:  At the present time include Tylenol, adenosine, albuterol, amiodarone,

aspirin, Lipitor, folic acid, heparin, Tapazole, metoprolol, norepinephrine, and

vasopressin.  

 

SOCIAL HISTORY:  Lives in the community.  Is a former smoker, she stopped several

years ago however.  History of heavy tobacco use.  No history of alcohol abuse or HIV

risk factors.  

 

SYSTEMS REVIEW:

Neurologic:  No loss of consciousness, seizure activity, focal weakness.

Cardiac:  As per HPI.

Respiratory:  As per HPI. 

Gastrointestinal:  Negative vomiting or diarrhea.  

Genitourinary:  Negative for urinary tract infection.  

 

LABORATORY DATA:  White count 27.1, hematocrit 32.5, platelets 248, BUN 28,

creatinine 1.4, lactic acid 4.6, total bilirubin 0.9, alkaline phosphatase 71, AST

5550, ALT 5368, urinalysis 8 white cells.  Chest x-ray negative for acute infiltrate.

 

 

PHYSICAL EXAMINATION:

General:  On physical examination she is unresponsive on the ventilator.  

Vital signs:  Temperature 92.9, T-max 100.2, blood pressure 92/50, pulse 87

irregular, respirations 20 per minute.  

HEENT:  Sclerae anicteric.  

Cardiovascular:  Heart sounds S1, S2.

Lungs:  Air entry bilaterally.    

Abdomen:  Obese. Soft, nontender.  

Extremities:  Positive for edema.  

 

IMPRESSION:

1.  Status post cardiopulmonary arrest.  

2.  Rule out sepsis/septic shock.  

3.  Marked leukocytosis.

4.  Acute kidney injury.  

5.  Elevated liver enzymes, probable shock liver.

6.  Lactic acidosis.  

7.  Exacerbation chronic obstructive pulmonary disease.

8.  Questionable AMOXICILLIN allergy.  

9.  Tachyarrhythmia.

 

Obtain cultures.  Empiric antibiotic coverage with meropenem and vancomycin. 

Continue ventilatory and hemodynamic support.  Prognosis is guarded.  Discussed with

family at bedside.  Critical care time spent 40 minutes.  Thank you for the kind

referral.  

 

 

SYDNIE PUTNAM M.D.

 

[id number]

DD: 06/09/2019 11:28

DT: 06/09/2019 12:24

Job #:  [id number]

## 2019-06-10 LAB
ALBUMIN SERPL-MCNC: 2.1 G/DL (ref 3.4–5)
ALP SERPL-CCNC: 61 U/L (ref 45–117)
ANION GAP SERPL CALC-SCNC: 11 MMOL/L (ref 8–16)
ANION GAP SERPL CALC-SCNC: 9 MMOL/L (ref 8–16)
APTT BLD: > 400 SECONDS (ref 25.2–36.5)
ARTERIAL BLOOD GAS PCO2: 34.4 MMHG (ref 35–45)
ARTERIAL PATENCY WRIST A: POSITIVE
BASE EXCESS BLDA CALC-SCNC: 1.2 MEQ/L (ref -2–2)
BILIRUB SERPL-MCNC: 0.6 MG/DL (ref 0.2–1)
BUN SERPL-MCNC: 36.7 MG/DL (ref 7–18)
BUN SERPL-MCNC: 37.3 MG/DL (ref 7–18)
CALCIUM SERPL-MCNC: 7 MG/DL (ref 8.5–10.1)
CALCIUM SERPL-MCNC: 7.1 MG/DL (ref 8.5–10.1)
CHLORIDE SERPL-SCNC: 99 MMOL/L (ref 98–107)
CHLORIDE SERPL-SCNC: 99 MMOL/L (ref 98–107)
CO2 SERPL-SCNC: 26 MMOL/L (ref 21–32)
CO2 SERPL-SCNC: 28 MMOL/L (ref 21–32)
CREAT SERPL-MCNC: 1.7 MG/DL (ref 0.55–1.3)
CREAT SERPL-MCNC: 1.9 MG/DL (ref 0.55–1.3)
DEPRECATED RDW RBC AUTO: 12.9 % (ref 11.6–15.6)
GLUCOSE SERPL-MCNC: 185 MG/DL (ref 74–106)
GLUCOSE SERPL-MCNC: 266 MG/DL (ref 74–106)
HCT VFR BLD CALC: 29.2 % (ref 32.4–45.2)
HGB BLD-MCNC: 10.1 GM/DL (ref 10.7–15.3)
INR BLD: 1.49 (ref 0.83–1.09)
MAGNESIUM SERPL-MCNC: 2 MG/DL (ref 1.8–2.4)
MAGNESIUM SERPL-MCNC: 2.1 MG/DL (ref 1.8–2.4)
MCH RBC QN AUTO: 32.4 PG (ref 25.7–33.7)
MCHC RBC AUTO-ENTMCNC: 34.5 G/DL (ref 32–36)
MCV RBC: 93.8 FL (ref 80–96)
PHOSPHATE SERPL-MCNC: 4 MG/DL (ref 2.5–4.9)
PLATELET # BLD AUTO: 202 K/MM3 (ref 134–434)
PMV BLD: 7.6 FL (ref 7.5–11.1)
PO2 BLDA: 119 MMHG (ref 80–105)
POTASSIUM SERPLBLD-SCNC: 2.7 MMOL/L (ref 3.5–5.1)
POTASSIUM SERPLBLD-SCNC: 3.1 MMOL/L (ref 3.5–5.1)
PROT SERPL-MCNC: 4.4 G/DL (ref 6.4–8.2)
PT PNL PPP: 17.7 SEC (ref 9.7–13)
RBC # BLD AUTO: 3.11 M/MM3 (ref 3.6–5.2)
SAO2 % BLDA: 98.7 % (ref 95–98)
SODIUM SERPL-SCNC: 136 MMOL/L (ref 136–145)
SODIUM SERPL-SCNC: 136 MMOL/L (ref 136–145)
WBC # BLD AUTO: 25.1 K/MM3 (ref 4–10)

## 2019-06-10 RX ADMIN — POTASSIUM & SODIUM PHOSPHATES POWDER PACK 280-160-250 MG SCH: 280-160-250 PACK at 17:23

## 2019-06-10 RX ADMIN — VANCOMYCIN HYDROCHLORIDE SCH MLS/HR: 1 INJECTION, POWDER, LYOPHILIZED, FOR SOLUTION INTRAVENOUS at 12:06

## 2019-06-10 RX ADMIN — HYDROCORTISONE SODIUM SUCCINATE SCH MG: 100 INJECTION, POWDER, FOR SOLUTION INTRAMUSCULAR; INTRAVENOUS at 01:12

## 2019-06-10 RX ADMIN — MEROPENEM SCH MLS/HR: 500 INJECTION, POWDER, FOR SOLUTION INTRAVENOUS at 10:33

## 2019-06-10 RX ADMIN — POTASSIUM CHLORIDE SCH MLS/HR: 7.46 INJECTION, SOLUTION INTRAVENOUS at 17:18

## 2019-06-10 RX ADMIN — INSULIN ASPART SCH UNITS: 100 INJECTION, SOLUTION INTRAVENOUS; SUBCUTANEOUS at 01:16

## 2019-06-10 RX ADMIN — INSULIN ASPART SCH: 100 INJECTION, SOLUTION INTRAVENOUS; SUBCUTANEOUS at 17:22

## 2019-06-10 RX ADMIN — DEXTROSE MONOHYDRATE SCH MLS/HR: 50 INJECTION, SOLUTION INTRAVENOUS at 11:00

## 2019-06-10 RX ADMIN — METHIMAZOLE SCH: 5 TABLET ORAL at 17:25

## 2019-06-10 RX ADMIN — ASPIRIN 81 MG SCH: 81 TABLET ORAL at 17:18

## 2019-06-10 RX ADMIN — ATORVASTATIN CALCIUM SCH: 40 TABLET, FILM COATED ORAL at 21:10

## 2019-06-10 RX ADMIN — HEPARIN SODIUM SCH: 5000 INJECTION, SOLUTION INTRAVENOUS at 19:27

## 2019-06-10 RX ADMIN — FOLIC ACID SCH: 1 TABLET ORAL at 17:19

## 2019-06-10 RX ADMIN — INSULIN ASPART SCH: 100 INJECTION, SOLUTION INTRAVENOUS; SUBCUTANEOUS at 17:23

## 2019-06-10 RX ADMIN — HYDROCORTISONE SODIUM SUCCINATE SCH MG: 100 INJECTION, POWDER, FOR SOLUTION INTRAMUSCULAR; INTRAVENOUS at 18:33

## 2019-06-10 RX ADMIN — FLUTICASONE PROPIONATE SCH: 50 SPRAY, METERED NASAL at 17:19

## 2019-06-10 RX ADMIN — HYDROCORTISONE SODIUM SUCCINATE SCH MG: 100 INJECTION, POWDER, FOR SOLUTION INTRAMUSCULAR; INTRAVENOUS at 12:33

## 2019-06-10 RX ADMIN — INSULIN ASPART SCH UNITS: 100 INJECTION, SOLUTION INTRAVENOUS; SUBCUTANEOUS at 22:21

## 2019-06-10 RX ADMIN — POTASSIUM CHLORIDE SCH MLS/HR: 7.46 INJECTION, SOLUTION INTRAVENOUS at 12:34

## 2019-06-10 RX ADMIN — POTASSIUM CHLORIDE SCH MLS/HR: 7.46 INJECTION, SOLUTION INTRAVENOUS at 08:56

## 2019-06-10 RX ADMIN — PANTOPRAZOLE SODIUM SCH MG: 40 INJECTION, POWDER, FOR SOLUTION INTRAVENOUS at 12:33

## 2019-06-10 RX ADMIN — INSULIN ASPART SCH UNITS: 100 INJECTION, SOLUTION INTRAVENOUS; SUBCUTANEOUS at 05:16

## 2019-06-10 RX ADMIN — Medication SCH MLS/HR: at 03:09

## 2019-06-10 RX ADMIN — DEXTROSE MONOHYDRATE SCH: 50 INJECTION, SOLUTION INTRAVENOUS at 17:28

## 2019-06-10 RX ADMIN — CYANOCOBALAMIN SCH MCG: 1000 INJECTION, SOLUTION INTRAMUSCULAR at 11:05

## 2019-06-10 RX ADMIN — THIAMINE HYDROCHLORIDE SCH MG: 100 INJECTION, SOLUTION INTRAMUSCULAR; INTRAVENOUS at 11:00

## 2019-06-10 RX ADMIN — METHIMAZOLE SCH: 10 TABLET ORAL at 21:10

## 2019-06-10 RX ADMIN — FLUTICASONE PROPIONATE SCH: 50 SPRAY, METERED NASAL at 21:10

## 2019-06-10 RX ADMIN — Medication SCH: at 17:22

## 2019-06-10 RX ADMIN — MEROPENEM SCH MLS/HR: 500 INJECTION, POWDER, FOR SOLUTION INTRAVENOUS at 20:46

## 2019-06-10 RX ADMIN — INSULIN ASPART SCH UNITS: 100 INJECTION, SOLUTION INTRAVENOUS; SUBCUTANEOUS at 18:32

## 2019-06-10 RX ADMIN — MEROPENEM SCH MLS/HR: 500 INJECTION, POWDER, FOR SOLUTION INTRAVENOUS at 01:11

## 2019-06-10 RX ADMIN — DEXTROSE MONOHYDRATE SCH MLS/HR: 50 INJECTION, SOLUTION INTRAVENOUS at 03:10

## 2019-06-10 RX ADMIN — SODIUM CHLORIDE SCH: 9 INJECTION, SOLUTION INTRAVENOUS at 05:33

## 2019-06-10 NOTE — PN
Progress Note, Physician


History of Present Illness: 





OFF SEDATION


REMAINS UNRESPONSIVE ON VENTILATOR


HYPOTENSIVE ON PRESSORS


HYPOTHERMIA PROTOCOL COMPLETED


WBC 25K


BC NO GROWTH





- Current Medication List


Current Medications: 


Active Medications





Albuterol Sulfate (Ventolin 0.083% Nebulizer Soln -)  1 amp NEB Q4H PRN


   PRN Reason: SHORT OF BREATH/WHEEZING


Aspirin (Asa -)  81 mg PO DAILY DORIE


   Last Admin: 06/09/19 16:03 Dose:  Not Given


Atorvastatin Calcium (Lipitor -)  40 mg PO HS DORIE


   Last Admin: 06/09/19 21:40 Dose:  Not Given


Cyanocobalamin (Vitamin B12 Injection -)  1,000 mcg IM DAILY DORIE


   Stop: 06/11/19 10:01


   Last Admin: 06/09/19 13:19 Dose:  1,000 mcg


Fluticasone Propionate (Flonase -)  1 spray NS BID DORIE


   Last Admin: 06/09/19 21:40 Dose:  Not Given


Folic Acid (Folic Acid -)  1 mg PO DAILY DORIE


   Last Admin: 06/09/19 16:03 Dose:  Not Given


Heparin Sodium (Porcine) (Heparin -)  3,500 unit 40 unit/kg (3500 unit) IVPUSH 

PRN PRN


   PRN Reason: For aPTT 35 to 45 seconds


Heparin Sodium (Porcine) (Heparin -)  7,000 unit 80 unit/kg (7000 unit) IVPUSH 

PRN PRN


   PRN Reason: aPTT <35 seconds


Hydrocortisone Sodium Succinate (Solu-Cortef -)  100 mg IVPUSH Q8H-IV DORIE


   Last Admin: 06/10/19 12:33 Dose:  100 mg


Norepinephrine Bitartrate 8, (000 mcg/ Dextrose)  500 mls @ 18.75 mls/hr IV 

TITR DORIE; Protocol


   Last Admin: 06/10/19 11:00 Dose:  4 mcg/min, 15 mls/hr


Heparin Sodium/Dextrose (Heparin Infusion -)  25,000 units in 500 mls @ 31.515 

mls/hr IVPB TITR DORIE; Protocol


   Last Titration: 06/10/19 04:45 Dose:  12 units/kg/hr, 21.01 mls/hr


Midazolam HCl (Midazolam 100mg/100ml-0.9%Nacl)  100 mg in 100 mls @ 1 mls/hr 

IVPB TITR DORIE; Protocol


   Last Admin: 06/10/19 03:09 Dose:  6 mg/hr, 6 mls/hr


Vancomycin HCl (Vancomycin (Pre-Docked))  1,000 mg in 250 mls @ 166.667 mls/hr 

IVPB Q24H [facility]; Protocol


   Last Admin: 06/10/19 12:06 Dose:  166.667 mls/hr


Meropenem 500 mg/ Dextrose  100 mls @ 200 mls/hr IVPB Q8H-IV [facility]


   Last Admin: 06/10/19 10:33 Dose:  200 mls/hr


Fentanyl 500 mcg/ Dextrose  100 mls @ 10 mls/hr IVPB TITR [facility]; Protocol


   Last Admin: 06/10/19 03:10 Dose:  50 mcg/hr, 10 mls/hr


Insulin Aspart (Novolog Vial Sliding Scale -)  1 vial SQ Q4HPO [facility]; Protocol


   Last Admin: 06/10/19 05:16 Dose:  6 units


Methimazole (Tapazole -)  5 mg PO BID [facility]


   Last Admin: 06/09/19 21:41 Dose:  Not Given


Metoprolol Succinate (Toprol Xl -)  12.5 mg PO DAILY [facility]


   Last Admin: 06/08/19 09:12 Dose:  12.5 mg


Pantoprazole Sodium (Protonix Iv)  40 mg IVPUSH DAILY [facility]


   Last Admin: 06/10/19 12:33 Dose:  40 mg


Potassium Chloride (Potassium Chloride Oral Liquid)  40 meq NGT BID [facility]


   Stop: 06/10/19 22:01


   Last Admin: 06/10/19 10:33 Dose:  Not Given


Potassium Phos/Sodium Phos (Phos-Nak Packet -)  1 packet PO DAILY [facility]


   Last Admin: 06/09/19 16:03 Dose:  Not Given


Sodium Chloride (Ocean Spray Nasal Spray -)  2 spray NS TID PRN


   PRN Reason: NASAL CONGESTION


Thiamine HCl (Vitamin B1 Injection -)  200 mg IVPB DAILY [facility]


   Last Admin: 06/09/19 13:22 Dose:  200 mg











- Objective


Vital Signs: 


 Vital Signs











Temperature  92.1 F L  06/10/19 11:00


 


Pulse Rate  63   06/10/19 11:00


 


Respiratory Rate  20   06/10/19 13:54


 


Blood Pressure  106/57 L  06/10/19 11:00


 


O2 Sat by Pulse Oximetry (%)  95   06/10/19 11:30











Constitutional: Yes: Obese


Cardiovascular: Yes: Regular Rate and Rhythm, Bradycardia, S1, S2


Respiratory: Yes: Mechanically Ventilated


Gastrointestinal: Yes: Normal Bowel Sounds, Soft, Abdomen, Obese.  No: 

Tenderness


Labs: 


 CBC, BMP





 06/10/19 05:10 











Assessment/Plan





S/P CARDIOPULMONARY ARREST


R/O SEPSIS


THOMPSON


PROBABLE SHOCK LIVER


COPD


AWAIT C/S


CONTINUE EMPIRIC MEROPENEM


VENTILATORY/ HEMODYNAMIC SUPPORT


PROGNOSIS POOR

## 2019-06-10 NOTE — ECHO
______________________________________________________________________________



Name: ANT AMANDA                               Exam:Adult Echocardiogram

MRN: [id number]         Study Date: 06/10/2019 08:38 AM

Age: 74 yrs

______________________________________________________________________________



Reason For Study: R HEART STRAIN

Height: 67 in        Weight: 193 lb        BSA: 2.0 m2



______________________________________________________________________________



MMode/2D Measurements & Calculations

Ao root diam: 2.8 cm                                        LVOT diam: 1.7 cm

LA dimension: 2.4 cm



Doppler Measurements & Calculations

MV E max sadiq: 52.8 cm/sec                            Med Peak E' Sadiq: 4.7 cm/sec

MV A max sadiq: 90.3 cm/sec                            Med E/e': 11.2

MV E/A: 0.58                                         Lat Peak E' Sadiq: 5.5 cm/sec

MV dec time: 0.23 sec                                Lat E/e': 9.6





______________________________________________________________________________



Procedure

A complete two-dimensional transthoracic echocardiogram was performed (2D, M-mode, Doppler and color 
flow

Doppler).

Left Ventricle

The left ventricle is normal in size. Left ventricular systolic function is normal. Ejection Fraction
 = 60-

65%. Grade I diastolic dysfunction, (abnormal relaxation pattern). Ratio E/E'= 11. No regional wall m
otion

abnormalities noted.

Right Ventricle

The right ventricle is normal size. The right ventricular systolic function is normal.

Atria

The left atrial size is normal. Right atrial size is normal.

Mitral Valve

There is mild mitral annular calcification. There is no mitral regurgitation noted.

Tricuspid Valve

The tricuspid valve is normal in structure and function. No tricuspid regurgitation.

Aortic Valve

The aortic valve is normal in structure and function. No aortic regurgitation is present.

Pulmonic Valve

The pulmonic valve is not well visualized.

Great Vessels

The aortic root is normal size.

Pericardium/Pleura

There is no pericardial effusion.

______________________________________________________________________________





Interpretation Summary

The left ventricle is normal in size.

Left ventricular systolic function is normal.

No regional wall motion abnormalities noted.

Ejection Fraction = 60-65%.

Grade I diastolic dysfunction, (abnormal relaxation pattern).

Ratio E/E'= 11

The right ventricle is normal size.

The right ventricular systolic function is normal.

The left atrial size is normal.

Right atrial size is normal.

There is mild mitral annular calcification.

The aortic root is normal size.

There is no pericardial effusion.



Previous study is not available for comparison





Cale Hall MD 06/10/2019 01:03 PM

## 2019-06-10 NOTE — PN
Physical Exam: 


SUBJECTIVE: Patient seen and examined at bedside this morning. She remains 

sedated on Fentanyl and Midazolam, and currently on Norepinepherine. 

Vasopressin has been titrated off. Hypothermia protocol was initiated yesterday 

morning. Patient in sinus rhythm overnight.





OBJECTIVE:


 Vital Signs











 Period  Temp  Pulse  Resp  BP Sys/Craft  Pulse Ox


 


 Last 24 Hr  90.8 F-97.1 F    16-27  /43-94  








GENERAL: The patient is intubated, sedated.


HEENT: Normocephalic. Pupils 1mm. Sclera anicteric. Dry mucous membranes. Right 

sided internal jugular central venous catheter.


NECK: Supple, without lymphadenopathy


LUNGS: Mechanical breath sounds, equal air entry bilaterally. Faint rhonchi 

auscultated bilaterally. 


HEART: S1, S2 auscultated without murmur, rub or gallop. RRR


ABDOMEN: Obese abdomen. Soft, mildly distended. Hypoactive bowel sounds x4 

quadrants. Hepatomegaly palpated and percussed 2cm below costal margin. 


EXTREMITIES: 1+ radial, dorsalis pedis pulses bilaterally. Cool extremities. 

Trace pitting edema bilateral lower extremities. 


NEUROLOGICAL: Patient is sedated. Unresponsive to sternal rub. 


SKIN: Cool, dry. Eccchymosis noted along abdomen. 





 Laboratory Results - last 24 hr











  06/09/19 06/09/19 06/09/19





  05:30 08:19 08:19


 


WBC   27.1 H 


 


RBC   3.39 L 


 


Hgb   10.8 


 


Hct   32.5 


 


MCV   95.9 


 


MCH   32.0 


 


MCHC   33.3 


 


RDW   12.9 


 


Plt Count   272 


 


MPV   7.1 L 


 


Neutrophils % (Manual)  67.3  


 


Band Neutrophils %  1.0  


 


Lymphocytes % (Manual)  16.8  D  


 


Monocytes % (Manual)  4  


 


Eosinophils % (Manual)  0.0  


 


Basophils % (Manual)  0.0  


 


Myelocytes % (Man)  0  


 


Promyelocytes % (Man)  0  


 


Blast Cells % (Manual)  0  


 


Metamyelocytes  3 H D  


 


Hypochromia  0  


 


Platelet Estimate  Normal  


 


Polychromasia  2+  


 


Poikilocytosis  1+  


 


Anisocytosis  2+  


 


Microcytosis  1+  


 


Macrocytosis  0  


 


Spherocytes  1+  


 


Tear Drop Cells  1+  


 


Josep Cells  1+  


 


Schistocytes  1+  


 


PTT (Actin FS)   


 


Puncture Site   


 


ABG pH   


 


ABG pCO2 at Pt Temp   


 


ABG pO2 at Pt Temp   


 


ABG HCO3   


 


ABG O2 Sat (Measured)   


 


ABG O2 Content   


 


ABG Base Excess   


 


Dylan Test   


 


O2 Delivery Device   


 


Oxygen Flow Rate   


 


Vent Mode   


 


Vent Rate   


 


Mechanical Rate   


 


PEEP   


 


Pressure Support Vent   


 


Sodium    139


 


Potassium    4.2


 


Chloride    102


 


Carbon Dioxide    27


 


Anion Gap    11


 


BUN    28.6 H


 


Creatinine    1.4 H


 


Est GFR (CKD-EPI)AfAm    43.09


 


Est GFR (CKD-EPI)NonAf    37.18


 


POC Glucometer   


 


Random Glucose    207 H


 


Lactic Acid   


 


Calcium    7.3 L


 


Phosphorus    7.0 H


 


Magnesium    2.2


 


Total Bilirubin    0.9


 


AST    5550 H


 


ALT    5368 H


 


Alkaline Phosphatase    71


 


Creatine Kinase   


 


Creatine Kinase Index   


 


CK-MB (CK-2)   


 


Troponin I    0.83 H*


 


Total Protein    4.9 L


 


Albumin    2.4 L


 


TSH   


 


Free T4   


 


Random Vancomycin   














  06/09/19 06/09/19 06/09/19





  08:19 08:50 11:58


 


WBC   


 


RBC   


 


Hgb   


 


Hct   


 


MCV   


 


MCH   


 


MCHC   


 


RDW   


 


Plt Count   


 


MPV   


 


Neutrophils % (Manual)   


 


Band Neutrophils %   


 


Lymphocytes % (Manual)   


 


Monocytes % (Manual)   


 


Eosinophils % (Manual)   


 


Basophils % (Manual)   


 


Myelocytes % (Man)   


 


Promyelocytes % (Man)   


 


Blast Cells % (Manual)   


 


Metamyelocytes   


 


Hypochromia   


 


Platelet Estimate   


 


Polychromasia   


 


Poikilocytosis   


 


Anisocytosis   


 


Microcytosis   


 


Macrocytosis   


 


Spherocytes   


 


Tear Drop Cells   


 


Josep Cells   


 


Schistocytes   


 


PTT (Actin FS)   


 


Puncture Site   Right radial 


 


ABG pH   7.30 L 


 


ABG pCO2 at Pt Temp   52.2 H 


 


ABG pO2 at Pt Temp   211 H 


 


ABG HCO3   25.1 


 


ABG O2 Sat (Measured)   99.5 H 


 


ABG O2 Content   15.2 


 


ABG Base Excess   -1.2 


 


Dylan Test   Positive 


 


O2 Delivery Device   Vent 


 


Oxygen Flow Rate   100% 


 


Vent Mode   A/c 


 


Vent Rate   15 


 


Mechanical Rate   Yes 


 


PEEP   5.0 


 


Pressure Support Vent   450 


 


Sodium   


 


Potassium   


 


Chloride   


 


Carbon Dioxide   


 


Anion Gap   


 


BUN   


 


Creatinine   


 


Est GFR (CKD-EPI)AfAm   


 


Est GFR (CKD-EPI)NonAf   


 


POC Glucometer    300


 


Random Glucose   


 


Lactic Acid  4.6 H*  


 


Calcium   


 


Phosphorus   


 


Magnesium   


 


Total Bilirubin   


 


AST   


 


ALT   


 


Alkaline Phosphatase   


 


Creatine Kinase   


 


Creatine Kinase Index   


 


CK-MB (CK-2)   


 


Troponin I   


 


Total Protein   


 


Albumin   


 


TSH   


 


Free T4   


 


Random Vancomycin   














  06/09/19 06/09/19 06/09/19





  14:45 14:45 17:29


 


WBC   


 


RBC   


 


Hgb   


 


Hct   


 


MCV   


 


MCH   


 


MCHC   


 


RDW   


 


Plt Count   


 


MPV   


 


Neutrophils % (Manual)   


 


Band Neutrophils %   


 


Lymphocytes % (Manual)   


 


Monocytes % (Manual)   


 


Eosinophils % (Manual)   


 


Basophils % (Manual)   


 


Myelocytes % (Man)   


 


Promyelocytes % (Man)   


 


Blast Cells % (Manual)   


 


Metamyelocytes   


 


Hypochromia   


 


Platelet Estimate   


 


Polychromasia   


 


Poikilocytosis   


 


Anisocytosis   


 


Microcytosis   


 


Macrocytosis   


 


Spherocytes   


 


Tear Drop Cells   


 


[place] Cells   


 


Schistocytes   


 


PTT (Actin FS)   133.7 H 


 


Puncture Site   


 


ABG pH   


 


ABG pCO2 at Pt Temp   


 


ABG pO2 at Pt Temp   


 


ABG HCO3   


 


ABG O2 Sat (Measured)   


 


ABG O2 Content   


 


ABG Base Excess   


 


Dylan Test   


 


O2 Delivery Device   


 


Oxygen Flow Rate   


 


Vent Mode   


 


Vent Rate   


 


Mechanical Rate   


 


PEEP   


 


Pressure Support Vent   


 


Sodium   


 


Potassium   


 


Chloride   


 


Carbon Dioxide   


 


Anion Gap   


 


BUN   


 


Creatinine   


 


Est GFR (CKD-EPI)AfAm   


 


Est GFR (CKD-EPI)NonAf   


 


POC Glucometer    482


 


Random Glucose   


 


Lactic Acid   


 


Calcium   


 


Phosphorus   


 


Magnesium   


 


Total Bilirubin   


 


AST   


 


ALT   


 


Alkaline Phosphatase   


 


Creatine Kinase  1622 H  


 


Creatine Kinase Index  2.5  


 


CK-MB (CK-2)  41.4 H  


 


Troponin I  1.78 H*  


 


Total Protein   


 


Albumin   


 


TSH   


 


Free T4   


 


Random Vancomycin   














  06/09/19 06/09/19 06/10/19





  20:30 21:46 00:00


 


WBC   


 


RBC   


 


Hgb   


 


Hct   


 


MCV   


 


MCH   


 


MCHC   


 


RDW   


 


Plt Count   


 


MPV   


 


Neutrophils % (Manual)   


 


Band Neutrophils %   


 


Lymphocytes % (Manual)   


 


Monocytes % (Manual)   


 


Eosinophils % (Manual)   


 


Basophils % (Manual)   


 


Myelocytes % (Man)   


 


Promyelocytes % (Man)   


 


Blast Cells % (Manual)   


 


Metamyelocytes   


 


Hypochromia   


 


Platelet Estimate   


 


Polychromasia   


 


Poikilocytosis   


 


Anisocytosis   


 


Microcytosis   


 


Macrocytosis   


 


Spherocytes   


 


Tear Drop Cells   


 


[place] Cells   


 


Schistocytes   


 


PTT (Actin FS)    > 400.0 H


 


Puncture Site   


 


ABG pH   


 


ABG pCO2 at Pt Temp   


 


ABG pO2 at Pt Temp   


 


ABG HCO3   


 


ABG O2 Sat (Measured)   


 


ABG O2 Content   


 


ABG Base Excess   


 


Dylan Test   


 


O2 Delivery Device   


 


Oxygen Flow Rate   


 


Vent Mode   


 


Vent Rate   


 


Mechanical Rate   


 


PEEP   


 


Pressure Support Vent   


 


Sodium   


 


Potassium   


 


Chloride   


 


Carbon Dioxide   


 


Anion Gap   


 


BUN   


 


Creatinine   


 


Est GFR (CKD-EPI)AfAm   


 


Est GFR (CKD-EPI)NonAf   


 


POC Glucometer   413 


 


Random Glucose   


 


Lactic Acid   


 


Calcium   


 


Phosphorus   


 


Magnesium   


 


Total Bilirubin   


 


AST   


 


ALT   


 


Alkaline Phosphatase   


 


Creatine Kinase  1662 H  


 


Creatine Kinase Index  2.6  


 


CK-MB (CK-2)  44.6 H  


 


Troponin I  1.54 H*  


 


Total Protein   


 


Albumin   


 


TSH   


 


Free T4   


 


Random Vancomycin   














  06/10/19 06/10/19 06/10/19





  01:15 05:10 05:10


 


WBC   


 


RBC   


 


Hgb   


 


Hct   


 


MCV   


 


MCH   


 


MCHC   


 


RDW   


 


Plt Count   


 


MPV   


 


Neutrophils % (Manual)   


 


Band Neutrophils %   


 


Lymphocytes % (Manual)   


 


Monocytes % (Manual)   


 


Eosinophils % (Manual)   


 


Basophils % (Manual)   


 


Myelocytes % (Man)   


 


Promyelocytes % (Man)   


 


Blast Cells % (Manual)   


 


Metamyelocytes   


 


Hypochromia   


 


Platelet Estimate   


 


Polychromasia   


 


Poikilocytosis   


 


Anisocytosis   


 


Microcytosis   


 


Macrocytosis   


 


Spherocytes   


 


Tear Drop Cells   


 


[place] Cells   


 


Schistocytes   


 


PTT (Actin FS)   


 


Puncture Site   


 


ABG pH   


 


ABG pCO2 at Pt Temp   


 


ABG pO2 at Pt Temp   


 


ABG HCO3   


 


ABG O2 Sat (Measured)   


 


ABG O2 Content   


 


ABG Base Excess   


 


Dylan Test   


 


O2 Delivery Device   


 


Oxygen Flow Rate   


 


Vent Mode   


 


Vent Rate   


 


Mechanical Rate   


 


PEEP   


 


Pressure Support Vent   


 


Sodium    136


 


Potassium   


 


Chloride    99


 


Carbon Dioxide    26


 


Anion Gap    11


 


BUN    36.7 H


 


Creatinine    1.7 H


 


Est GFR (CKD-EPI)AfAm    34.08


 


Est GFR (CKD-EPI)NonAf    29.40


 


POC Glucometer  423  


 


Random Glucose    266 H


 


Lactic Acid   


 


Calcium    7.0 L


 


Phosphorus    4.0


 


Magnesium    2.1


 


Total Bilirubin    0.6


 


AST    4816 H


 


ALT    4221 H


 


Alkaline Phosphatase    61


 


Creatine Kinase   


 


Creatine Kinase Index   


 


CK-MB (CK-2)   


 


Troponin I   


 


Total Protein    4.4 L


 


Albumin    2.1 L


 


TSH    0.02 L


 


Free T4    1.77 H


 


Random Vancomycin   15.5 L 














  06/10/19 06/10/19 06/10/19





  05:10 05:13 05:40


 


WBC   


 


RBC   


 


Hgb   


 


Hct   


 


MCV   


 


MCH   


 


MCHC   


 


RDW   


 


Plt Count   


 


MPV   


 


Neutrophils % (Manual)   


 


Band Neutrophils %   


 


Lymphocytes % (Manual)   


 


Monocytes % (Manual)   


 


Eosinophils % (Manual)   


 


Basophils % (Manual)   


 


Myelocytes % (Man)   


 


Promyelocytes % (Man)   


 


Blast Cells % (Manual)   


 


Metamyelocytes   


 


Hypochromia   


 


Platelet Estimate   


 


Polychromasia   


 


Poikilocytosis   


 


Anisocytosis   


 


Microcytosis   


 


Macrocytosis   


 


Spherocytes   


 


Tear Drop Cells   


 


[place] Cells   


 


Schistocytes   


 


PTT (Actin FS)   


 


Puncture Site    Right radial


 


ABG pH    7.46 H


 


ABG pCO2 at Pt Temp    34.4 L


 


ABG pO2 at Pt Temp    119 H


 


ABG HCO3    24.3


 


ABG O2 Sat (Measured)    98.7 H


 


ABG O2 Content    14.0 L


 


ABG Base Excess    1.2


 


Dylan Test    Positive


 


O2 Delivery Device    Vent


 


Oxygen Flow Rate    60%


 


Vent Mode    A/c


 


Vent Rate    24


 


Mechanical Rate    Yes


 


PEEP    5.0


 


Pressure Support Vent    450


 


Sodium   


 


Potassium   


 


Chloride   


 


Carbon Dioxide   


 


Anion Gap   


 


BUN   


 


Creatinine   


 


Est GFR (CKD-EPI)AfAm   


 


Est GFR (CKD-EPI)NonAf   


 


POC Glucometer   287 


 


Random Glucose   


 


Lactic Acid   


 


Calcium   


 


Phosphorus   


 


Magnesium   


 


Total Bilirubin   


 


AST   


 


ALT   


 


Alkaline Phosphatase   


 


Creatine Kinase  1400 H  


 


Creatine Kinase Index   


 


CK-MB (CK-2)   


 


Troponin I  0.91 H*  


 


Total Protein   


 


Albumin   


 


TSH   


 


Free T4   


 


Random Vancomycin   








Active Medications











Generic Name Dose Route Start Last Admin





  Trade Name Freq  PRN Reason Stop Dose Admin


 


Albuterol Sulfate  1 amp  06/06/19 10:27  





  Ventolin 0.083% Nebulizer Soln -  NEB   





  Q4H PRN   





  SHORT OF BREATH/WHEEZING   





     





     





     


 


Aspirin  81 mg  06/04/19 10:00  06/09/19 16:03





  Asa -  PO   Not Given





  DAILY DORIE   





     





     





     





     


 


Atorvastatin Calcium  40 mg  06/03/19 22:00  06/09/19 21:40





  Lipitor -  PO   Not Given





  HS DORIE   





     





     





     





     


 


Cyanocobalamin  1,000 mcg  06/05/19 10:45  06/09/19 13:19





  Vitamin B12 Injection -  IM  06/11/19 10:01  1,000 mcg





  DAILY DORIE   Administration





     





     





     





     


 


Fluticasone Propionate  1 spray  06/05/19 22:00  06/09/19 21:40





  Flonase -  NS   Not Given





  BID DORIE   





     





     





     





     


 


Folic Acid  1 mg  06/05/19 10:45  06/09/19 16:03





  Folic Acid -  PO   Not Given





  DAILY DORIE   





     





     





     





     


 


Heparin Sodium (Porcine)  3,500 unit  06/09/19 06:48  





  Heparin -  40 unit/kg (3500 unit)   





  IVPUSH   





  PRN PRN   





  For aPTT 35 to 45 seconds   





     





     





     


 


Heparin Sodium (Porcine)  7,000 unit  06/09/19 06:48  





  Heparin -  80 unit/kg (7000 unit)   





  IVPUSH   





  PRN PRN   





  aPTT <35 seconds   





     





     





     


 


Hydrocortisone Sodium Succinate  100 mg  06/09/19 10:00  06/10/19 01:12





  Solu-Cortef -  IVPUSH   100 mg





  Q8H-IV DORIE   Administration





     





     





     





     


 


Norepinephrine Bitartrate 8,  500 mls @ 18.75 mls/hr  06/09/19 06:30  06/10/19 

05:02





  000 mcg/ Dextrose  IV   3 mcg/min





  TITR DORIE   11.25 mls/hr





     Titration





     





  Protocol   





  5 MCG/MIN   


 


Heparin Sodium/Dextrose  25,000 units in 500 mls @ 31.515 mls/hr  06/09/19 07:

00  06/10/19 04:45





  Heparin Infusion -  IVPB   12 units/kg/hr





  TITR DORIE   21.01 mls/hr





     Titration





     





  Protocol   





  18 UNITS/KG/HR   


 


Midazolam HCl  100 mg in 100 mls @ 1 mls/hr  06/09/19 10:00  06/10/19 03:09





  Midazolam 100mg/100ml-0.9%Nacl  IVPB   6 mg/hr





  TITR DORIE   6 mls/hr





     Administration





     





  Protocol   





  1 MG/HR   


 


Vancomycin HCl  1,000 mg in 250 mls @ 166.667 mls/hr  06/09/19 11:45  06/09/19 

11:43





  Vancomycin (Pre-Docked)  IVPB   Not Given





  Q24H DORIE   





     





     





  Protocol   





     


 


Meropenem 500 mg/ Dextrose  100 mls @ 200 mls/hr  06/09/19 11:45  06/10/19 01:11





  IVPB   200 mls/hr





  Q8H-IV DORIE   Administration





     





     





     





     


 


Fentanyl 500 mcg/ Dextrose  100 mls @ 10 mls/hr  06/09/19 14:45  06/10/19 03:10





  IVPB   50 mcg/hr





  TITR DORIE   10 mls/hr





     Administration





     





  Protocol   





  50 MCG/HR   


 


Insulin Aspart  1 vial  06/09/19 18:00  06/10/19 05:16





  Novolog Vial Sliding Scale -  SQ   6 units





  Q4HPO DORIE   Administration





     





     





  Protocol   





     


 


Methimazole  5 mg  06/07/19 22:00  06/09/19 21:41





  Tapazole -  PO   Not Given





  BID DORIE   





     





     





     





     


 


Metoprolol Succinate  12.5 mg  06/04/19 10:00  06/08/19 09:12





  Toprol Xl -  PO   12.5 mg





  DAILY DORIE   Administration





     





     





     





     


 


Pantoprazole Sodium  40 mg  06/09/19 11:15  06/09/19 12:02





  Protonix Iv  IVPUSH   40 mg





  DAILY DORIE   Administration





     





     





     





     


 


Potassium Phos/Sodium Phos  1 packet  06/07/19 10:00  06/09/19 16:03





  Phos-Nak Packet -  PO   Not Given





  DAILY DORIE   





     





     





     





     


 


Sodium Chloride  2 spray  06/05/19 15:21  





  Ocean Spray Nasal Spray -  NS   





  TID PRN   





  NASAL CONGESTION   





     





     





     


 


Thiamine HCl  200 mg  06/05/19 10:45  06/09/19 13:22





  Vitamin B1 Injection -  IVPB   200 mg





  DAILY DORIE   Administration





     





     





     





     











ASSESSMENT/PLAN:


Patient is a 74 year old female with history of COPD, hypertension, 

hyperlipidemia, osteoarthritis, pre-diabetes, presented with complaint of 

shortness of breath. Admitted to ICU after cardiac arrest. 





Neurologic


 -Patient is sedated on Versed, Fentanyl drip. Patient was unresponsive to voice

, and sternal rub prior to initiation of sedation 


 -Hold Olanzapine while patient sedated


 -Will stop sedation once hypothermia protocol is complete, to assess mental 

status. 


 -Monitor for signs of mental status changes


 -Neurology consult (Dr. Garcia) appreciated


 -Follow CT head noncontrast





Cardiac


S/P three episodes cardiac arrest 


Afib vs. SVT 


History of hypertension


History of hyperlipidemia


 -Troponin peaked at 1.78 


 -Patient started on Heparin drip. Follow PTT


 -Hypothermia protocol 


 -Hydrocortisone 100mg IV Q8H stress dose steroids


 -Patient recieved Adenosine 6mg IV, and 12mg IV push. Amiodarone 150mg IV


 -Amiodarone drip discontinued. 


 -Cardiac ECHO


 -Cardiac monitoring 


 -CVP monitoring


 -Cardiology recommendations (Dr. Dorsey) appreciated





Pulmonary


 -Intubated. Ventilator settings RR 20,  FiO2 40%, PEEP 5


 -Chest radiograph shows no acute infiltrates


 -ABG this morning pH 7.46, CO2 34.4, O2 119, HCO3 24.3. Improvement of 

respiratory acidosis.


 -Maintain oxygen saturation greater than 90%


 -Daily CXR


 -CTA chest to evaluate for pulmonary embolism (once creatinine improves). 

Duplex US lower extremities negative for pulmonary embolism. 





Gastrointestinal


 -Currently NPO while intubated


 -Transamitis likely secondary to shock. AST 4816, ALT 4221 Alkaline 

phosphatase 61, total bilirubin 0.6


 -Will trend CMP


 


Renal


 -THOMPSON likely secondary to hypoperfusion


 -BUN 36.7 / Cr 1.7


 -Follow barnhart catheter output


 -Will trend CMP


 -Nephrology recommendations (Dr. Godinez) appreciated





Infectious disease


 -WBC increased to 27.1 s/p cardiac arrest. 


 -Repeat blood, urine cultures


 -Meropenem 500mg IV Q8H


 -Vancomycin 1000mg IV Q24H


 -Random Vancomycin level tomorrow


 -Infectious disease recommendations (Dr. Darling) appreciated. 


 


FEN


 -Fluids: Not on IV fluids


 -Electrolytes: Hypokalemia, repleted. Follow CMP, replete as necessary. 


 -Nutrition: NPO while intubated. 





Prophylaxis


 -Patient is on Heparin drip 


 -Protonix 40mg IV daily





Disposition


 -Continue care in ICU








Visit type





- Emergency Visit


Emergency Visit: Yes


ED Registration Date: 06/03/19


Care time: The patient presented to the Emergency Department on the above date 

and was hospitalized for further evaluation of their emergent condition.





- New Patient


This patient is new to me today: No





- Critical Care


Critical Care patient: Yes


Total Critical Care Time (in minutes): 37


Critical Care Statement: The care of this patient involved high complexity 

decision making to prevent further life threatening deterioration of the patient

's condition and/or to evaluate & treat vital organ system(s) failure or risk 

of failure.





- Discharge Referral


Referred to [facility] No

## 2019-06-10 NOTE — PN
Progress Note, Physician


Chief Complaint: 





patient seen and examined intubated sedated on fentanyl levophed and heparin 

drip





- Current Medication List


Current Medications: 


Active Medications





Albuterol Sulfate (Ventolin 0.083% Nebulizer Soln -)  1 amp NEB Q4H PRN


   PRN Reason: SHORT OF BREATH/WHEEZING


Aspirin (Asa -)  81 mg PO DAILY DORIE


   Last Admin: 06/09/19 16:03 Dose:  Not Given


Atorvastatin Calcium (Lipitor -)  40 mg PO HS DORIE


   Last Admin: 06/09/19 21:40 Dose:  Not Given


Cyanocobalamin (Vitamin B12 Injection -)  1,000 mcg IM DAILY DORIE


   Stop: 06/11/19 10:01


   Last Admin: 06/09/19 13:19 Dose:  1,000 mcg


Fluticasone Propionate (Flonase -)  1 spray NS BID DORIE


   Last Admin: 06/09/19 21:40 Dose:  Not Given


Folic Acid (Folic Acid -)  1 mg PO DAILY DORIE


   Last Admin: 06/09/19 16:03 Dose:  Not Given


Heparin Sodium (Porcine) (Heparin -)  3,500 unit 40 unit/kg (3500 unit) IVPUSH 

PRN PRN


   PRN Reason: For aPTT 35 to 45 seconds


Heparin Sodium (Porcine) (Heparin -)  7,000 unit 80 unit/kg (7000 unit) IVPUSH 

PRN PRN


   PRN Reason: aPTT <35 seconds


Hydrocortisone Sodium Succinate (Solu-Cortef -)  100 mg IVPUSH Q8H-IV DORIE


   Last Admin: 06/10/19 12:33 Dose:  100 mg


Norepinephrine Bitartrate 8, (000 mcg/ Dextrose)  500 mls @ 18.75 mls/hr IV 

TITR DORIE; Protocol


   Last Admin: 06/10/19 11:00 Dose:  4 mcg/min, 15 mls/hr


Heparin Sodium/Dextrose (Heparin Infusion -)  25,000 units in 500 mls @ 31.515 

mls/hr IVPB TITR DORIE; Protocol


   Last Titration: 06/10/19 04:45 Dose:  12 units/kg/hr, 21.01 mls/hr


Midazolam HCl (Midazolam 100mg/100ml-0.9%Nacl)  100 mg in 100 mls @ 1 mls/hr 

IVPB TITR DORIE; Protocol


   Last Admin: 06/10/19 03:09 Dose:  6 mg/hr, 6 mls/hr


Vancomycin HCl (Vancomycin (Pre-Docked))  1,000 mg in 250 mls @ 166.667 mls/hr 

IVPB Q24H [facility]; Protocol


   Last Admin: 06/10/19 12:06 Dose:  166.667 mls/hr


Meropenem 500 mg/ Dextrose  100 mls @ 200 mls/hr IVPB Q8H-IV [facility]


   Last Admin: 06/10/19 10:33 Dose:  200 mls/hr


Fentanyl 500 mcg/ Dextrose  100 mls @ 10 mls/hr IVPB TITR [facility]; Protocol


   Last Admin: 06/10/19 03:10 Dose:  50 mcg/hr, 10 mls/hr


Insulin Aspart (Novolog Vial Sliding Scale -)  1 vial SQ Q4HPO [facility]; Protocol


   Last Admin: 06/10/19 05:16 Dose:  6 units


Methimazole (Tapazole -)  5 mg PO BID [facility]


   Last Admin: 06/09/19 21:41 Dose:  Not Given


Metoprolol Succinate (Toprol Xl -)  12.5 mg PO DAILY [facility]


   Last Admin: 06/08/19 09:12 Dose:  12.5 mg


Pantoprazole Sodium (Protonix Iv)  40 mg IVPUSH DAILY [facility]


   Last Admin: 06/10/19 12:33 Dose:  40 mg


Potassium Chloride (Potassium Chloride Oral Liquid)  40 meq NGT BID [facility]


   Stop: 06/10/19 22:01


   Last Admin: 06/10/19 10:33 Dose:  Not Given


Potassium Phos/Sodium Phos (Phos-Nak Packet -)  1 packet PO DAILY [facility]


   Last Admin: 06/09/19 16:03 Dose:  Not Given


Sodium Chloride (Ocean Spray Nasal Spray -)  2 spray NS TID PRN


   PRN Reason: NASAL CONGESTION


Thiamine HCl (Vitamin B1 Injection -)  200 mg IVPB DAILY [facility]


   Last Admin: 06/09/19 13:22 Dose:  200 mg











- Objective


Vital Signs: 


 Vital Signs











Temperature  92.1 F L  06/10/19 11:00


 


Pulse Rate  63   06/10/19 11:00


 


Respiratory Rate  20   06/10/19 11:30


 


Blood Pressure  106/57 L  06/10/19 11:00


 


O2 Sat by Pulse Oximetry (%)  95   06/10/19 11:30











Constitutional: Yes: Calm


Neck: Yes: Other (intubated)


Cardiovascular: Yes: Regular Rate and Rhythm, S1, S2


Respiratory: Yes: Mechanically Ventilated


Gastrointestinal: Yes: Normal Bowel Sounds, Soft


Neurological: Yes: Other (sedated and intubated)


Labs: 


 CBC, BMP





 06/10/19 05:10 





 06/10/19 05:10 











Problem List





- Problems


(1) Cardiopulmonary arrest


Assessment/Plan: 


intubated sedated


icu monitoring


hypothermia protocol


on pressors


wbc count trending up and is on stress dose steroids


meropenem and vancomycin





Code(s): I46.9 - CARDIAC ARREST, CAUSE UNSPECIFIED   





(2) Elevated troponin


Assessment/Plan: 


echo


heparin drip


statin


aspirin


Code(s): R74.8 - ABNORMAL LEVELS OF OTHER SERUM ENZYMES   





Assessment/Plan





hypokalemia: repleted


recheck bmp and magnesium

## 2019-06-10 NOTE — PN
Progress Note, Physician


Chief Complaint: 





S/P Cardiac arrest


History of Present Illness: 


This is a 74 year old female with a PMH of HTN, HLD, pre-DM2, and COPD. She was 

initially admitted for a COPD exacerbation with respiratory failure and was 

treated with steroids and ABXC. She was found unresponsive and a code 99 called 

and CPR administered with ROSC after several round of epi, bicarb, followed by 

1 cardioversion, hypothermia protocol initiated. She was noted to be in AFIB 

with RVR. Amiodarone was started and she converted to NSR. 





6/10/19 She remains intubated, on pressors, and unresponsive.


Echocardiogram EF 60%


DD


Normal RV size and function


Mild MR


No TR


No AI





Trops trending down 1.54 to 0.91


K+ 2.7

















- Current Medication List


Current Medications: 


Active Medications





Albuterol Sulfate (Ventolin 0.083% Nebulizer Soln -)  1 amp NEB Q4H PRN


   PRN Reason: SHORT OF BREATH/WHEEZING


Aspirin (Asa -)  81 mg PO DAILY [facility]


   Last Admin: 06/09/19 16:03 Dose:  Not Given


Atorvastatin Calcium (Lipitor -)  40 mg PO HS [facility]


   Last Admin: 06/09/19 21:40 Dose:  Not Given


Cyanocobalamin (Vitamin B12 Injection -)  1,000 mcg IM DAILY [facility]


   Stop: 06/11/19 10:01


   Last Admin: 06/09/19 13:19 Dose:  1,000 mcg


Fluticasone Propionate (Flonase -)  1 spray NS BID [facility]


   Last Admin: 06/09/19 21:40 Dose:  Not Given


Folic Acid (Folic Acid -)  1 mg PO DAILY [facility]


   Last Admin: 06/09/19 16:03 Dose:  Not Given


Heparin Sodium (Porcine) (Heparin -)  3,500 unit 40 unit/kg (3500 unit) IVPUSH 

PRN PRN


   PRN Reason: For aPTT 35 to 45 seconds


Heparin Sodium (Porcine) (Heparin -)  7,000 unit 80 unit/kg (7000 unit) IVPUSH 

PRN PRN


   PRN Reason: aPTT <35 seconds


Hydrocortisone Sodium Succinate (Solu-Cortef -)  100 mg IVPUSH Q8H-IV [facility]


   Last Admin: 06/10/19 12:33 Dose:  100 mg


Norepinephrine Bitartrate 8, (000 mcg/ Dextrose)  500 mls @ 18.75 mls/hr IV 

TITR [facility]; Protocol


   Last Admin: 06/10/19 11:00 Dose:  4 mcg/min, 15 mls/hr


Heparin Sodium/Dextrose (Heparin Infusion -)  25,000 units in 500 mls @ 31.515 

mls/hr IVPB TITR [facility]; Protocol


   Last Titration: 06/10/19 04:45 Dose:  12 units/kg/hr, 21.01 mls/hr


Midazolam HCl (Midazolam 100mg/100ml-0.9%Nacl)  100 mg in 100 mls @ 1 mls/hr 

IVPB TITR [facility]; Protocol


   Last Admin: 06/10/19 03:09 Dose:  6 mg/hr, 6 mls/hr


Vancomycin HCl (Vancomycin (Pre-Docked))  1,000 mg in 250 mls @ 166.667 mls/hr 

IVPB Q24H [facility]; Protocol


   Last Admin: 06/10/19 12:06 Dose:  166.667 mls/hr


Meropenem 500 mg/ Dextrose  100 mls @ 200 mls/hr IVPB Q8H-IV [facility]


   Last Admin: 06/10/19 10:33 Dose:  200 mls/hr


Fentanyl 500 mcg/ Dextrose  100 mls @ 10 mls/hr IVPB TITR [facility]; Protocol


   Last Admin: 06/10/19 03:10 Dose:  50 mcg/hr, 10 mls/hr


Insulin Aspart (Novolog Vial Sliding Scale -)  1 vial SQ Q4HPO [facility]; Protocol


   Last Admin: 06/10/19 05:16 Dose:  6 units


Methimazole (Tapazole -)  5 mg PO BID [facility]


   Last Admin: 06/09/19 21:41 Dose:  Not Given


Metoprolol Succinate (Toprol Xl -)  12.5 mg PO DAILY [facility]


   Last Admin: 06/08/19 09:12 Dose:  12.5 mg


Pantoprazole Sodium (Protonix Iv)  40 mg IVPUSH DAILY [facility]


   Last Admin: 06/10/19 12:33 Dose:  40 mg


Potassium Chloride (Potassium Chloride Oral Liquid)  40 meq NGT BID [facility]


   Stop: 06/10/19 22:01


   Last Admin: 06/10/19 10:33 Dose:  Not Given


Potassium Phos/Sodium Phos (Phos-Nak Packet -)  1 packet PO DAILY [facility]


   Last Admin: 06/09/19 16:03 Dose:  Not Given


Sodium Chloride (Ocean Spray Nasal Spray -)  2 spray NS TID PRN


   PRN Reason: NASAL CONGESTION


Thiamine HCl (Vitamin B1 Injection -)  200 mg IVPB DAILY [facility]


   Last Admin: 06/09/19 13:22 Dose:  200 mg











- Objective


Vital Signs: 


 Vital Signs











Temperature  92.1 F L  06/10/19 11:00


 


Pulse Rate  63   06/10/19 11:00


 


Respiratory Rate  20   06/10/19 13:54


 


Blood Pressure  106/57 L  06/10/19 11:00


 


O2 Sat by Pulse Oximetry (%)  95   06/10/19 11:30











Constitutional: Yes: Other (Intubated and unresponsive)


Neck: Yes: WNL


Cardiovascular: Yes: Regular Rate and Rhythm (NL S1S2 no MRHG)


Respiratory: Yes: Mechanically Ventilated


Gastrointestinal: Yes: Soft


Edema: LLE: Trace, RLE: Trace


Neurological: Yes: Unresponsive


Labs: 


 CBC, BMP





 06/10/19 05:10 











Assessment/Plan











74 year old female with a PMH of HTN, HLD, pre-DM2, and COPD. She was initially 

admitted for a COPD exacerbation with respiratory failure and was treated with 

steroids and ABXC. She was found unresponsive and a code 99 called and CPR 

administered with ROSC after several round of epi, bicarb, followed by 1 

cardioversion, hypothermia protocol initiated. She was noted to be in AFIB with 

RVR. Amiodarone was started and she converted to NSR. 





6/10/19 She remains intubated, on pressors, and unresponsive.


Echocardiogram EF 60%


Normal RV size and function


Trops trending down 1.54 to 0.91


K+ 2.7


Remains in NSR





Recommend:


Continue supportive care


Replete K+


Hold metoprolol


PE less likely given normal RV size and function with no TR

## 2019-06-10 NOTE — PN
Teaching Attending Note


Name of Resident: Jerel Thomas





ATTENDING PHYSICIAN STATEMENT





I saw and evaluated the patient.


I reviewed the resident's note and discussed the case with the resident.


I agree with the resident's findings and plan as documented.








SUBJECTIVE:





Pt seen and examined in the ICU. Remains intubated, sedated on lower dose 

levophed gtt. Hypothermia protocol in progress. Remains in sinus rhythm.





OBJECTIVE:





 Vital Signs











 Period  Temp  Pulse  Resp  BP Sys/Craft  Pulse Ox


 


 Last 24 Hr  90.8 F-92.8 F    20-27  103-156/43-94  








 Intake & Output











 06/07/19 06/08/19 06/09/19 06/10/19





 23:59 23:59 23:59 23:59


 


Intake Total  760


 


Output Total   880 200


 


Balance  560


 


Weight   89.131 kg 93.128 kg








Gen:  intubated, sedated


Heart: RRR


Lung: decreased breath sounds at the bases


Abd: soft, nontender


Ext: no edema





 CBC, BMP





 06/10/19 05:10 





 06/10/19 05:10 





Active Medications





Albuterol Sulfate (Ventolin 0.083% Nebulizer Soln -)  1 amp NEB Q4H PRN


   PRN Reason: SHORT OF BREATH/WHEEZING


Aspirin (Asa -)  81 mg PO DAILY [facility]


   Last Admin: 06/09/19 16:03 Dose:  Not Given


Atorvastatin Calcium (Lipitor -)  40 mg PO HS [facility]


   Last Admin: 06/09/19 21:40 Dose:  Not Given


Cyanocobalamin (Vitamin B12 Injection -)  1,000 mcg IM DAILY [facility]


   Stop: 06/11/19 10:01


   Last Admin: 06/09/19 13:19 Dose:  1,000 mcg


Fluticasone Propionate (Flonase -)  1 spray NS BID [facility]


   Last Admin: 06/09/19 21:40 Dose:  Not Given


Folic Acid (Folic Acid -)  1 mg PO DAILY [facility]


   Last Admin: 06/09/19 16:03 Dose:  Not Given


Heparin Sodium (Porcine) (Heparin -)  3,500 unit 40 unit/kg (3500 unit) IVPUSH 

PRN PRN


   PRN Reason: For aPTT 35 to 45 seconds


Heparin Sodium (Porcine) (Heparin -)  7,000 unit 80 unit/kg (7000 unit) IVPUSH 

PRN PRN


   PRN Reason: aPTT <35 seconds


Hydrocortisone Sodium Succinate (Solu-Cortef -)  100 mg IVPUSH Q8H-IV [facility]


   Last Admin: 06/10/19 01:12 Dose:  100 mg


Norepinephrine Bitartrate 8, (000 mcg/ Dextrose)  500 mls @ 18.75 mls/hr IV 

TITR [facility]; Protocol


   Last Titration: 06/10/19 05:02 Dose:  3 mcg/min, 11.25 mls/hr


Heparin Sodium/Dextrose (Heparin Infusion -)  25,000 units in 500 mls @ 31.515 

mls/hr IVPB TITR [facility]; Protocol


   Last Titration: 06/10/19 04:45 Dose:  12 units/kg/hr, 21.01 mls/hr


Midazolam HCl (Midazolam 100mg/100ml-0.9%Nacl)  100 mg in 100 mls @ 1 mls/hr 

IVPB TITR [facility]; Protocol


   Last Admin: 06/10/19 03:09 Dose:  6 mg/hr, 6 mls/hr


Vancomycin HCl (Vancomycin (Pre-Docked))  1,000 mg in 250 mls @ 166.667 mls/hr 

IVPB Q24H [facility]; Protocol


   Last Admin: 06/09/19 11:43 Dose:  Not Given


Meropenem 500 mg/ Dextrose  100 mls @ 200 mls/hr IVPB Q8H-IV [facility]


   Last Admin: 06/10/19 10:33 Dose:  200 mls/hr


Fentanyl 500 mcg/ Dextrose  100 mls @ 10 mls/hr IVPB TITR [facility]; Protocol


   Last Admin: 06/10/19 03:10 Dose:  50 mcg/hr, 10 mls/hr


Insulin Aspart (Novolog Vial Sliding Scale -)  1 vial SQ Q4HPO [facility]; Protocol


   Last Admin: 06/10/19 05:16 Dose:  6 units


Methimazole (Tapazole -)  5 mg PO BID [facility]


   Last Admin: 06/09/19 21:41 Dose:  Not Given


Metoprolol Succinate (Toprol Xl -)  12.5 mg PO DAILY [facility]


   Last Admin: 06/08/19 09:12 Dose:  12.5 mg


Pantoprazole Sodium (Protonix Iv)  40 mg IVPUSH DAILY [facility]


   Last Admin: 06/09/19 12:02 Dose:  40 mg


Potassium Chloride (Potassium Chloride Oral Liquid)  40 meq NGT BID [facility]


   Stop: 06/10/19 22:01


   Last Admin: 06/10/19 10:33 Dose:  Not Given


Potassium Phos/Sodium Phos (Phos-Nak Packet -)  1 packet PO DAILY [facility]


   Last Admin: 06/09/19 16:03 Dose:  Not Given


Sodium Chloride (Ocean Spray Nasal Spray -)  2 spray NS TID PRN


   PRN Reason: NASAL CONGESTION


Thiamine HCl (Vitamin B1 Injection -)  200 mg IVPB DAILY [facility]


   Last Admin: 06/09/19 13:22 Dose:  200 mg








ASSESSMENT AND PLAN:


s/p Cardiopulmonary Arrest


Shock/Multiorgan Dysfunction


r/o PE


Acute Kidney Injury


Lactic Acidosis


Elevated LFTs likely Ischemic Injury


+Troponins likely Demand Ischemia


SVT vs Rapid Atrial Fibrillation


Acute COPD Exacerbation


Acute Bronchitis/Sinusitis


HTN


Hyperlipidemia


Nonspecific Lung Nodules





-  continue empiric anticoagulation


-  complete hypothermia protocol


-  IVF to keep CVP 8-12


-  titrate pressors to maintain MAP >65


-  echocardiogram


-  will need CTA chest when renal function improves


-  empiric stress dose steroids


-  inhaled bronchodilators standing and PRN


-  continue empiric antibiotic coverage


-  f/u cultures


-  sedate for vent synchrony


-  titrate Fio2 to keep Spo2 >90%


-  lighten sedation when hypothermia protocol complete to assess mental status


-  spontaneous breathing trials when mental status improved


-  outpt f/u of lung nodules


-  DVT/GI prophylaxis


-  prognosis guarded


-  continue ICU monitoring








critical care time spent in reviewing chart, evaluating patient and formulating 

plan 35 min








Problem List





- Problems


(1) COPD exacerbation


Code(s): J44.1 - CHRONIC OBSTRUCTIVE PULMONARY DISEASE W (ACUTE) EXACERBATION

## 2019-06-10 NOTE — EKG
Test Reason : 

Blood Pressure : ***/*** mmHG

Vent. Rate : 131 BPM     Atrial Rate : 131 BPM

   P-R Int : 118 ms          QRS Dur : 134 ms

    QT Int : 324 ms       P-R-T Axes : 069 118 013 degrees

   QTc Int : 478 ms

 

SINUS TACHYCARDIA

RIGHT BUNDLE BRANCH BLOCK

LEFT POSTERIOR FASCICULAR BLOCK

*** BIFASCICULAR BLOCK ***

ABNORMAL ECG

WHEN COMPARED WITH ECG OF 09-JUN-2019 05:06,

SINUS RHYTHM HAS REPLACED ATRIAL FIBRILLATION

VENT. RATE HAS DECREASED

Confirmed by KISHAN FREED MD ([id number]) on 6/10/2019 11:45:04 AM

 

Referred By:             Confirmed By:KISHAN FREED MD

## 2019-06-10 NOTE — PN
Progress Note (short form)





- Note


Progress Note: 





Informed of aPTT >400. Will stop Heparin gtt by 2 hours and then decrease by -3U

/kg/hr given cooling protocol and shock liver will impeded coagulation cascade. 

No signs of bleeding at this time. If any bleed will immediately start 

Protamine sulfate reversal with FFP doses.





--Jose Eduardo Palacios, DO - IM PGY-2

## 2019-06-11 VITALS — HEART RATE: 83 BPM | DIASTOLIC BLOOD PRESSURE: 53 MMHG | SYSTOLIC BLOOD PRESSURE: 101 MMHG | TEMPERATURE: 98.1 F

## 2019-06-11 LAB
ALBUMIN SERPL-MCNC: 2.1 G/DL (ref 3.4–5)
ALP SERPL-CCNC: 80 U/L (ref 45–117)
ALT SERPL-CCNC: > 1000 U/L (ref 13–61)
ANION GAP SERPL CALC-SCNC: 12 MMOL/L (ref 8–16)
AST SERPL-CCNC: 1455 U/L (ref 15–37)
BILIRUB SERPL-MCNC: 0.8 MG/DL (ref 0.2–1)
BUN SERPL-MCNC: 42.3 MG/DL (ref 7–18)
CALCIUM SERPL-MCNC: 6.9 MG/DL (ref 8.5–10.1)
CHLORIDE SERPL-SCNC: 98 MMOL/L (ref 98–107)
CO2 SERPL-SCNC: 24 MMOL/L (ref 21–32)
CREAT SERPL-MCNC: 2.5 MG/DL (ref 0.55–1.3)
DEPRECATED RDW RBC AUTO: 13 % (ref 11.6–15.6)
GLUCOSE SERPL-MCNC: 196 MG/DL (ref 74–106)
HCT VFR BLD CALC: 33.6 % (ref 32.4–45.2)
HGB BLD-MCNC: 11.4 GM/DL (ref 10.7–15.3)
MAGNESIUM SERPL-MCNC: 1.8 MG/DL (ref 1.8–2.4)
MCH RBC QN AUTO: 31.9 PG (ref 25.7–33.7)
MCHC RBC AUTO-ENTMCNC: 33.8 G/DL (ref 32–36)
MCV RBC: 94.3 FL (ref 80–96)
PHOSPHATE SERPL-MCNC: 4.5 MG/DL (ref 2.5–4.9)
PLATELET # BLD AUTO: 240 K/MM3 (ref 134–434)
PMV BLD: 7.8 FL (ref 7.5–11.1)
POTASSIUM SERPLBLD-SCNC: 3.9 MMOL/L (ref 3.5–5.1)
PROT SERPL-MCNC: 4.4 G/DL (ref 6.4–8.2)
RBC # BLD AUTO: 3.57 M/MM3 (ref 3.6–5.2)
SODIUM SERPL-SCNC: 133 MMOL/L (ref 136–145)
WBC # BLD AUTO: 33.3 K/MM3 (ref 4–10)

## 2019-06-11 RX ADMIN — DEXTROSE MONOHYDRATE SCH MLS/HR: 50 INJECTION, SOLUTION INTRAVENOUS at 11:20

## 2019-06-11 RX ADMIN — INSULIN ASPART SCH UNITS: 100 INJECTION, SOLUTION INTRAVENOUS; SUBCUTANEOUS at 05:50

## 2019-06-11 RX ADMIN — THIAMINE HYDROCHLORIDE SCH MG: 100 INJECTION, SOLUTION INTRAMUSCULAR; INTRAVENOUS at 10:47

## 2019-06-11 RX ADMIN — CYANOCOBALAMIN SCH MCG: 1000 INJECTION, SOLUTION INTRAMUSCULAR at 09:30

## 2019-06-11 RX ADMIN — FLUTICASONE PROPIONATE SCH: 50 SPRAY, METERED NASAL at 11:12

## 2019-06-11 RX ADMIN — HYDROCORTISONE SODIUM SUCCINATE SCH MG: 100 INJECTION, POWDER, FOR SOLUTION INTRAMUSCULAR; INTRAVENOUS at 01:58

## 2019-06-11 RX ADMIN — METHIMAZOLE SCH: 10 TABLET ORAL at 05:42

## 2019-06-11 RX ADMIN — METHIMAZOLE SCH MG: 10 TABLET ORAL at 15:19

## 2019-06-11 RX ADMIN — HYDROCORTISONE SODIUM SUCCINATE SCH MG: 100 INJECTION, POWDER, FOR SOLUTION INTRAMUSCULAR; INTRAVENOUS at 10:45

## 2019-06-11 RX ADMIN — ASPIRIN 81 MG SCH MG: 81 TABLET ORAL at 11:12

## 2019-06-11 RX ADMIN — PANTOPRAZOLE SODIUM SCH MG: 40 INJECTION, POWDER, FOR SOLUTION INTRAVENOUS at 09:29

## 2019-06-11 RX ADMIN — VANCOMYCIN HYDROCHLORIDE SCH MLS/HR: 1 INJECTION, POWDER, LYOPHILIZED, FOR SOLUTION INTRAVENOUS at 11:09

## 2019-06-11 RX ADMIN — MEROPENEM SCH MLS/HR: 500 INJECTION, POWDER, FOR SOLUTION INTRAVENOUS at 09:28

## 2019-06-11 RX ADMIN — FOLIC ACID SCH MG: 1 TABLET ORAL at 11:12

## 2019-06-11 RX ADMIN — INSULIN ASPART SCH UNITS: 100 INJECTION, SOLUTION INTRAVENOUS; SUBCUTANEOUS at 14:50

## 2019-06-11 RX ADMIN — INSULIN ASPART SCH UNITS: 100 INJECTION, SOLUTION INTRAVENOUS; SUBCUTANEOUS at 11:10

## 2019-06-11 RX ADMIN — POTASSIUM & SODIUM PHOSPHATES POWDER PACK 280-160-250 MG SCH PACKET: 280-160-250 PACK at 11:11

## 2019-06-11 RX ADMIN — HEPARIN SODIUM SCH MLS/HR: 5000 INJECTION, SOLUTION INTRAVENOUS at 07:00

## 2019-06-11 RX ADMIN — Medication SCH: at 09:28

## 2019-06-11 RX ADMIN — INSULIN ASPART SCH UNITS: 100 INJECTION, SOLUTION INTRAVENOUS; SUBCUTANEOUS at 01:58

## 2019-06-11 RX ADMIN — DEXTROSE MONOHYDRATE SCH MLS/HR: 50 INJECTION, SOLUTION INTRAVENOUS at 02:26

## 2019-06-11 RX ADMIN — DEXTROSE MONOHYDRATE SCH: 50 INJECTION, SOLUTION INTRAVENOUS at 05:42

## 2019-06-11 RX ADMIN — MEROPENEM SCH MLS/HR: 500 INJECTION, POWDER, FOR SOLUTION INTRAVENOUS at 02:27

## 2019-06-11 NOTE — PN
Teaching Attending Note


Name of Resident: Jerel Thomas





ATTENDING PHYSICIAN STATEMENT





I saw and evaluated the patient.


I reviewed the resident's note and discussed the case with the resident.


I agree with the resident's findings and plan as documented.








SUBJECTIVE:





Pt seen and examined in the ICU. Remains intubated, unresponsive off sedation. 

CT head done suggestive of diffuse anoxic brain injury. Back on levophed and 

vasopressin gtts. Echocardiogram without significant findings.





OBJECTIVE:





 Vital Signs











 Period  Temp  Pulse  Resp  BP Sys/Craft  Pulse Ox


 


 Last 24 Hr  92.2 F-99.3 F    20-27  /26-83  90-97








 Intake & Output











 06/08/19 06/09/19 06/10/19 06/11/19





 23:59 23:59 23:59 23:59


 


Intake Total 500 3290 760 1296.4


 


Output Total  880 350 150


 


Balance 500 2410 410 1146.4


 


Weight  89.131 kg 93.128 kg 94.846 kg








Gen:  intubated, unresponsive


Heart: RRR


Lung: decreased breath sounds at the bases


Abd: soft, nontender


Ext: no edema





 CBC, BMP





 06/11/19 05:20 





 06/11/19 05:20 





Active Medications





Albuterol Sulfate (Ventolin 0.083% Nebulizer Soln -)  1 amp NEB Q4H PRN


   PRN Reason: SHORT OF BREATH/WHEEZING


Aspirin (Asa -)  81 mg PO DAILY [facility]


   Last Admin: 06/11/19 11:12 Dose:  81 mg


Atorvastatin Calcium (Lipitor -)  40 mg PO HS [facility]


   Last Admin: 06/10/19 21:10 Dose:  Not Given


Fluticasone Propionate (Flonase -)  1 spray NS BID [facility]


   Last Admin: 06/11/19 11:12 Dose:  Not Given


Folic Acid (Folic Acid -)  1 mg PO DAILY [facility]


   Last Admin: 06/11/19 11:12 Dose:  1 mg


Heparin Sodium (Porcine) (Heparin -)  3,500 unit 40 unit/kg (3500 unit) IVPUSH 

PRN PRN


   PRN Reason: For aPTT 35 to 45 seconds


Heparin Sodium (Porcine) (Heparin -)  7,000 unit 80 unit/kg (7000 unit) IVPUSH 

PRN PRN


   PRN Reason: aPTT <35 seconds


Hydrocortisone Sodium Succinate (Solu-Cortef -)  100 mg IVPUSH Q8H-IV [facility]


   Last Admin: 06/11/19 10:45 Dose:  100 mg


Norepinephrine Bitartrate 8, (000 mcg/ Dextrose)  500 mls @ 18.75 mls/hr IV 

TITR [facility]; Protocol


   Last Titration: 06/11/19 10:00 Dose:  25 mcg/min, 93.75 mls/hr


Heparin Sodium/Dextrose (Heparin Infusion -)  25,000 units in 500 mls @ 31.515 

mls/hr IVPB TITR [facility]; Protocol


   Last Titration: 06/11/19 10:05 Dose:  7.28 units/kg/hr, 12.746 mls/hr


Midazolam HCl (Midazolam 100mg/100ml-0.9%Nacl)  100 mg in 100 mls @ 1 mls/hr 

IVPB TITR [facility]; Protocol


   Last Admin: 06/11/19 09:28 Dose:  Not Given


Vancomycin HCl (Vancomycin (Pre-Docked))  1,000 mg in 250 mls @ 166.667 mls/hr 

IVPB Q24H [facility]; Protocol


   Last Admin: 06/11/19 11:09 Dose:  166.667 mls/hr


Meropenem 500 mg/ Dextrose  100 mls @ 200 mls/hr IVPB Q8H-IV [facility]


   Last Admin: 06/11/19 09:28 Dose:  200 mls/hr


Fentanyl 500 mcg/ Dextrose  100 mls @ 10 mls/hr IVPB TITR [facility]; Protocol


   Last Titration: 06/11/19 06:55 Dose:  50 mcg/hr, 10 mls/hr


Vasopressin 50 units/ Sodium (Chloride)  100 mls @ 4 mls/hr IVPB ASDIR [facility]; 

Protocol


   Last Titration: 06/11/19 09:10 Dose:  4 units/hr, 8 mls/hr


Dopamine HCl/Dextrose (Dopamine 400 Mg/D5w -)  400,000 mcg in 250 mls @ 17.784 

mls/hr IVPB TITR [facility]; Protocol


   Last Admin: 06/11/19 09:25 Dose:  Not Given


Insulin Aspart (Novolog Vial Sliding Scale -)  1 vial SQ Q4HPO [facility]; Protocol


   Last Admin: 06/11/19 11:10 Dose:  4 units


Methimazole (Tapazole -)  10 mg PO TID [facility]


   Last Admin: 06/11/19 05:42 Dose:  Not Given


Metoprolol Succinate (Toprol Xl -)  12.5 mg PO DAILY [facility]


   Last Admin: 06/08/19 09:12 Dose:  12.5 mg


Pantoprazole Sodium (Protonix Iv)  40 mg IVPUSH DAILY [facility]


   Last Admin: 06/11/19 09:29 Dose:  40 mg


Potassium Phos/Sodium Phos (Phos-Nak Packet -)  1 packet PO DAILY [facility]


   Last Admin: 06/11/19 11:11 Dose:  1 packet


Sodium Chloride (Ocean Spray Nasal Spray -)  2 spray NS TID PRN


   PRN Reason: NASAL CONGESTION


Thiamine HCl (Vitamin B1 Injection -)  200 mg IVPB DAILY [facility]


   Last Admin: 06/11/19 10:47 Dose:  200 mg








ASSESSMENT AND PLAN:


s/p Cardiopulmonary Arrest


Shock/Multiorgan Dysfunction


Acute Kidney Injury


Lactic Acidosis


Elevated LFTs likely Ischemic Injury


+Troponins likely Demand Ischemia


SVT vs Rapid Atrial Fibrillation


Acute COPD Exacerbation


Acute Bronchitis/Sinusitis


HTN


Hyperlipidemia


Nonspecific Lung Nodules





-  continue empiric anticoagulation


-  completed hypothermia protocol


-  IVF to keep CVP 8-12


-  titrate pressors to maintain MAP >65


-  will need CTA chest when renal function improves


-  empiric stress dose steroids


-  inhaled bronchodilators standing and PRN


-  continue empiric antibiotic coverage


-  f/u cultures


-  titrate Fio2 to keep Spo2 >90%


-  hold all sedation to assess mental status


-  spontaneous breathing trials if mental status improved


-  outpt f/u of lung nodules


-  DVT/GI prophylaxis


-  poor prognosis, discussed with family at bedside


-  continue ICU monitoring








critical care time spent in reviewing chart, evaluating patient and formulating 

plan 35 min





Problem List





- Problems


(1) COPD exacerbation


Code(s): J44.1 - CHRONIC OBSTRUCTIVE PULMONARY DISEASE W (ACUTE) EXACERBATION

## 2019-06-11 NOTE — PN
Progress Note (short form)





- Note


Progress Note: 





UPDATE 1: Pt stabilized MAPs of 65 at this point, tachycardic at 118bpm and 

saturation of 96%. Will continue to titrate pressors down as hemodynamics allow 

(goal of levophed below 12 before vasopressin discontinued). Continue to 

decrease ventilator settings back to previous.


---------------


Pt steadily decreasing MAP down to 50 alongside of increasing Levophed gtt 

requirements (currently 18) with notable sinus tachycardia at ~120bpm. CVP 7 

and pt desaturation down to 75%. Pt temp currently at 98.3 after rewarming ~0.4 

degrees per hour. Upon arrival pt agonally breathing and asynchronous with 

ventilator.





Brief PE:


    GEN: Pt agonally breathing on ventilator over respiratory rate set on vent, 

minimal eye movement with sternal rub


    HEENT: gag reflex diminished but intact, pupils sluggishly reactive and 

symmetrical at 2mm, no secretions from ETT


    LUNGS: Tachypneic without significant wheezing or rales noted


    VENT: Breaths initiating before volume reaches zero on flow loop, 

obstructive pattern noted


    CARDIAC: Tachycardic with regular rate





A/P:


--Likely vent asynchronization and worsening hemodynamic stability 2/2 to 

anoxic brain injury


--Bolus 500cc NS


--Vasopressin 2.4U/hr started and goal is to reduce levophed gtt to alleviate 

tachy-arrhythmias


--Changed ventilator settings:


       (delta 0)


      Increased FiO2 60% (delta 10%)


      Increased PEEP 7 (delta 2)


      Increased I:E ratio due to likelihood of breath stacking from asynchrony (

1:2 now vs. 1:1)


--Started fentanyl gtt for comfort and ventilator synchrony


--Prognosis poor





      --Jose Eduardo Palacios, DO - IM PGY-2

## 2019-06-11 NOTE — PN
Physical Exam: 


SUBJECTIVE: Patient seen and examined at bedside. Patient is unresponsive to 

verbal stimuli and sternal rub off sedation. Overnight, patient was noted to be 

hypotensive, with decreasing MAP, and was re-started on Vasopressin, with 

increased Levophed rate. CT head reveals diffuse bilateral cerebral and 

cerebellar edema. Case discussed with family at bedside.  


 


OBJECTIVE:


 Vital Signs











 Period  Temp  Pulse  Resp  BP Sys/Craft  Pulse Ox


 


 Last 24 Hr  91.0 F-98.6 F    18-26  /44-67  








GENERAL: The patient is intubated. Currently, off sedation. 


HEENT: Normocephalic. Pupils 2mm, with sluggish reaction to light. Sclera 

anicteric. Dry mucous membranes. Right sided internal jugular central venous 

catheter.


NECK: Supple, without lymphadenopathy


LUNGS: Mechanical breath sounds, equal air entry bilaterally. Faint rhonchi 

auscultated bilaterally. 


HEART: S1, S2 auscultated without murmur, rub or gallop. RRR


ABDOMEN: Obese abdomen. Soft, mildly distended. Hypoactive bowel sounds x4 

quadrants. Hepatomegaly palpated and percussed 2cm below costal margin. No 

rigidity appreciated. 


EXTREMITIES: 1+ radial, dorsalis pedis pulses bilaterally. Trace pitting edema 

bilateral lower extremities. 


NEUROLOGICAL: Patient is unresponsive to voice or sternal rub off sedation. 


SKIN: Warm, dry. Eccchymosis noted along abdomen. 





 Laboratory Results - last 24 hr











  06/10/19 06/10/19 06/10/19





  05:10 08:50 12:45


 


WBC   


 


RBC   


 


Hgb   


 


Hct   


 


MCV   


 


MCH   


 


MCHC   


 


RDW   


 


Plt Count  202  D  


 


MPV   


 


Manual Slide Review  Y  


 


PT with INR   


 


INR   


 


PTT (Actin FS)   > 400.0 H 


 


Fibrinogen   


 


Sodium   


 


Potassium   


 


Chloride   


 


Carbon Dioxide   


 


Anion Gap   


 


BUN   


 


Creatinine   


 


Est GFR (CKD-EPI)AfAm   


 


Est GFR (CKD-EPI)NonAf   


 


POC Glucometer    174


 


Random Glucose   


 


Calcium   


 


Phosphorus   


 


Magnesium   


 


Total Bilirubin   


 


AST   


 


ALT   


 


Alkaline Phosphatase   


 


Total Protein   


 


Albumin   














  06/10/19 06/10/19 06/10/19





  15:00 15:00 15:00


 


WBC   


 


RBC   


 


Hgb   


 


Hct   


 


MCV   


 


MCH   


 


MCHC   


 


RDW   


 


Plt Count   


 


MPV   


 


Manual Slide Review   


 


PT with INR  17.70 H  


 


INR  1.49 H  


 


PTT (Actin FS)  > 400.0 H  


 


Fibrinogen   351.0 


 


Sodium    136


 


Potassium    3.1 L


 


Chloride    99


 


Carbon Dioxide    28


 


Anion Gap    9


 


BUN    37.3 H


 


Creatinine    1.9 H


 


Est GFR (CKD-EPI)AfAm    29.79


 


Est GFR (CKD-EPI)NonAf    25.70


 


POC Glucometer   


 


Random Glucose    185 H


 


Calcium    7.1 L


 


Phosphorus   


 


Magnesium    2.0


 


Total Bilirubin   


 


AST   


 


ALT   


 


Alkaline Phosphatase   


 


Total Protein   


 


Albumin   














  06/10/19 06/10/19 06/10/19





  17:47 21:42 22:30


 


WBC   


 


RBC   


 


Hgb   


 


Hct   


 


MCV   


 


MCH   


 


MCHC   


 


RDW   


 


Plt Count   


 


MPV   


 


Manual Slide Review   


 


PT with INR   


 


INR   


 


PTT (Actin FS)    45.1 H


 


Fibrinogen   


 


Sodium   


 


Potassium   


 


Chloride   


 


Carbon Dioxide   


 


Anion Gap   


 


BUN   


 


Creatinine   


 


Est GFR (CKD-EPI)AfAm   


 


Est GFR (CKD-EPI)NonAf   


 


POC Glucometer  170  207 


 


Random Glucose   


 


Calcium   


 


Phosphorus   


 


Magnesium   


 


Total Bilirubin   


 


AST   


 


ALT   


 


Alkaline Phosphatase   


 


Total Protein   


 


Albumin   














  06/10/19 06/11/19 06/11/19





  22:30 01:53 05:20


 


WBC    33.3 H*


 


RBC    3.57 L


 


Hgb    11.4


 


Hct    33.6  D


 


MCV    94.3


 


MCH    31.9


 


MCHC    33.8


 


RDW    13.0


 


Plt Count    240


 


MPV    7.8


 


Manual Slide Review   


 


PT with INR   


 


INR   


 


PTT (Actin FS)   


 


Fibrinogen   


 


Sodium   


 


Potassium  3.8  


 


Chloride   


 


Carbon Dioxide   


 


Anion Gap   


 


BUN   


 


Creatinine   


 


Est GFR (CKD-EPI)AfAm   


 


Est GFR (CKD-EPI)NonAf   


 


POC Glucometer   170 


 


Random Glucose   


 


Calcium   


 


Phosphorus   


 


Magnesium   


 


Total Bilirubin   


 


AST   


 


ALT   


 


Alkaline Phosphatase   


 


Total Protein   


 


Albumin   














  06/11/19 06/11/19





  05:20 05:46


 


WBC  


 


RBC  


 


Hgb  


 


Hct  


 


MCV  


 


MCH  


 


MCHC  


 


RDW  


 


Plt Count  


 


MPV  


 


Manual Slide Review  


 


PT with INR  


 


INR  


 


PTT (Actin FS)  


 


Fibrinogen  


 


Sodium  133 L 


 


Potassium  3.9 


 


Chloride  98 


 


Carbon Dioxide  24 


 


Anion Gap  12 


 


BUN  42.3 H 


 


Creatinine  2.5 H 


 


Est GFR (CKD-EPI)AfAm  21.38 


 


Est GFR (CKD-EPI)NonAf  18.44 


 


POC Glucometer   165


 


Random Glucose  196 H 


 


Calcium  


 


Phosphorus  4.5 


 


Magnesium  1.8 


 


Total Bilirubin  0.8 


 


AST  1455 H 


 


ALT  > 1000 H 


 


Alkaline Phosphatase  80 


 


Total Protein  4.4 L 


 


Albumin  2.1 L 








Active Medications











Generic Name Dose Route Start Last Admin





  Trade Name Freq  PRN Reason Stop Dose Admin


 


Albuterol Sulfate  1 amp  06/06/19 10:27  





  Ventolin 0.083% Nebulizer Soln -  NEB   





  Q4H PRN   





  SHORT OF BREATH/WHEEZING   





     





     





     


 


Aspirin  81 mg  06/04/19 10:00  06/10/19 17:18





  Asa -  PO   Not Given





  DAILY DORIE   





     





     





     





     


 


Atorvastatin Calcium  40 mg  06/03/19 22:00  06/10/19 21:10





  Lipitor -  PO   Not Given





  HS DORIE   





     





     





     





     


 


Cyanocobalamin  1,000 mcg  06/05/19 10:45  06/10/19 11:05





  Vitamin B12 Injection -  IM  06/11/19 10:01  1,000 mcg





  DAILY DORIE   Administration





     





     





     





     


 


Fluticasone Propionate  1 spray  06/05/19 22:00  06/10/19 21:10





  Flonase -  NS   Not Given





  BID DORIE   





     





     





     





     


 


Folic Acid  1 mg  06/05/19 10:45  06/10/19 17:19





  Folic Acid -  PO   Not Given





  DAILY DORIE   





     





     





     





     


 


Heparin Sodium (Porcine)  3,500 unit  06/09/19 06:48  





  Heparin -  40 unit/kg (3500 unit)   





  IVPUSH   





  PRN PRN   





  For aPTT 35 to 45 seconds   





     





     





     


 


Heparin Sodium (Porcine)  7,000 unit  06/09/19 06:48  





  Heparin -  80 unit/kg (7000 unit)   





  IVPUSH   





  PRN PRN   





  aPTT <35 seconds   





     





     





     


 


Hydrocortisone Sodium Succinate  100 mg  06/09/19 10:00  06/11/19 01:58





  Solu-Cortef -  IVPUSH   100 mg





  Q8H-IV DORIE   Administration





     





     





     





     


 


Norepinephrine Bitartrate 8,  500 mls @ 18.75 mls/hr  06/09/19 06:30  06/11/19 

06:33





  000 mcg/ Dextrose  IV   15 mcg/min





  TITR DORIE   56.25 mls/hr





     Titration





     





  Protocol   





  5 MCG/MIN   


 


Heparin Sodium/Dextrose  25,000 units in 500 mls @ 31.515 mls/hr  06/09/19 07:

00  06/10/19 23:10





  Heparin Infusion -  IVPB   10.28 units/kg/hr





  TITR DORIE   18 mls/hr





     Titration





     





  Protocol   





  18 UNITS/KG/HR   


 


Midazolam HCl  100 mg in 100 mls @ 1 mls/hr  06/09/19 10:00  06/10/19 17:22





  Midazolam 100mg/100ml-0.9%Nacl  IVPB   Not Given





  TITR DORIE   





     





     





  Protocol   





  1 MG/HR   


 


Vancomycin HCl  1,000 mg in 250 mls @ 166.667 mls/hr  06/09/19 11:45  06/10/19 

12:06





  Vancomycin (Pre-Docked)  IVPB   166.667 mls/hr





  Q24H DORIE   Administration





     





     





  Protocol   





     


 


Meropenem 500 mg/ Dextrose  100 mls @ 200 mls/hr  06/09/19 11:45  06/11/19 02:27





  IVPB   200 mls/hr





  Q8H-IV DORIE   Administration





     





     





     





     


 


Fentanyl 500 mcg/ Dextrose  100 mls @ 10 mls/hr  06/09/19 14:45  06/11/19 06:55





  IVPB   50 mcg/hr





  TITR DORIE   10 mls/hr





     Titration





     





  Protocol   





  50 MCG/HR   


 


Vasopressin 50 units/ Sodium  100 mls @ 4 mls/hr  06/11/19 05:15  06/11/19 05:38





  Chloride  IVPB   2 units/hr





  ASDIR DORIE   4 mls/hr





     Administration





     





  Protocol   





  2 UNITS/HR   


 


Insulin Aspart  1 vial  06/09/19 18:00  06/11/19 05:50





  Novolog Vial Sliding Scale -  SQ   2 units





  Q4HPO DORIE   Administration





     





     





  Protocol   





     


 


Methimazole  10 mg  06/10/19 22:00  06/11/19 05:42





  Tapazole -  PO   Not Given





  TID DORIE   





     





     





     





     


 


Metoprolol Succinate  12.5 mg  06/04/19 10:00  06/08/19 09:12





  Toprol Xl -  PO   12.5 mg





  DAILY DORIE   Administration





     





     





     





     


 


Pantoprazole Sodium  40 mg  06/09/19 11:15  06/10/19 12:33





  Protonix Iv  IVPUSH   40 mg





  DAILY DORIE   Administration





     





     





     





     


 


Potassium Phos/Sodium Phos  1 packet  06/07/19 10:00  06/10/19 17:23





  Phos-Nak Packet -  PO   Not Given





  DAILY DORIE   





     





     





     





     


 


Sodium Chloride  2 spray  06/05/19 15:21  





  Ocean Spray Nasal Spray -  NS   





  TID PRN   





  NASAL CONGESTION   





     





     





     


 


Thiamine HCl  200 mg  06/05/19 10:45  06/10/19 11:00





  Vitamin B1 Injection -  IVPB   200 mg





  DAILY DORIE   Administration





     





     





     





     











ASSESSMENT/PLAN:


Patient is a 74 year old female with history of COPD, hypertension, 

hyperlipidemia, osteoarthritis, pre-diabetes, presented with complaint of 

shortness of breath. Admitted to ICU after cardiac arrest. 





Neurologic


 -Patient is currently off sedation. Patient was unresponsive to voice, and 

sternal rub prior to initiation of sedation 


 -CT head reveals diffuse bilateral cerebral and cerebellar edema.


 -Hold Olanzapine while patient sedated


 -Monitor for signs of mental status changes


 -Neurology consult (Dr. Huber) appreciated





Cardiac


S/P three episodes cardiac arrest 


Afib vs. SVT 


History of hypertension


History of hyperlipidemia


 -Troponin peaked at 1.78


 -Patient on Heparin drip. Follow PTT


 -Hypothermia protocol completed. 


 -Hydrocortisone 100mg IV Q8H stress dose steroids


 -Cardiac ECHO shows left ventricle normal in size, and normal systolic 

function. EF 60-65%. Grade 1 diastolic dysfunction. Right ventricle systolic 

function normal. No pericardial effusion. 


 -Cardiac monitoring 


 -CVP monitoring. Maintain between 8-12


 -Cardiology recommendations (Dr. Edward) appreciated





Pulmonary


 -Intubated. Ventilator settings RR 20,  FiO2 60%, PEEP 7


 -ABG pH 7.46, CO2 34.4, O2 119, HCO3 24.3. 


 -Maintain oxygen saturation greater than 90%


 -Daily CXR


 -Duplex US lower extremities negative for pulmonary embolism. 





Gastrointestinal


 -Currently NPO while intubated


 -Transamitis likely secondary to shock. AST 1455, ALT greater than 1000, 

Alkaline phosphatase 80, total bilirubin 0.8


 -Will trend CMP


 


Renal


 -THOMPSON likely secondary to hypoperfusion during cardiac arrest


 -BUN 42.3 / Cr 2.5 


 -Follow barnhart catheter output


 -Will trend CMP


 -Nephrology recommendations (Dr. Godinez) appreciated





Infectious disease


 -WBC increased to 33.3 s/p cardiac arrest. 


 -Repeat blood, urine cultures negative for growth at 24 hours


 -Empiric Meropenem 500mg IV Q8H


 -Infectious disease recommendations (Dr. Darling) appreciated. 


 


FEN


 -Fluids: No IV fluids currently indicated. 


 -Electrolytes: Hypocalcemia, repleted. Follow CMP, replete as necessary. 


 -Nutrition: NPO while intubated. 





Prophylaxis


 -Patient is on Heparin drip 


 -Protonix 40mg IV daily





Disposition


 -Continue care in ICU. 


Case discussed with [facility] (Arthur). Currently patient is NOT a candidate. 


Case discussed with primary team (Balbina Fields).





Visit type





- Emergency Visit


Emergency Visit: Yes


ED Registration Date: 06/03/19


Care time: The patient presented to the Emergency Department on the above date 

and was hospitalized for further evaluation of their emergent condition.





- New Patient


This patient is new to me today: No





- Critical Care


Critical Care patient: Yes


Total Critical Care Time (in minutes): 35


Critical Care Statement: The care of this patient involved high complexity 

decision making to prevent further life threatening deterioration of the patient

's condition and/or to evaluate & treat vital organ system(s) failure or risk 

of failure.





- Discharge Referral


Referred to [facility] No

## 2019-06-11 NOTE — CON.NEURO
Consult





- Past Medical History


Cardio/Vascular: Yes: HTN, Hyperlipdemia


Pulmonary: Yes: COPD


Musculoskeletal: Yes: Osteoarthritis


ENT: Yes: Allergic Rhinitis


Endocrine: Yes: Diabetes Mellitus (Prediabetes)





- Past Surgical History


Past Surgical History: Yes: Joint Replacement (right hip replacement, left arm 

surgery)





- Alcohol/Substance Use


Hx Alcohol Use: No


History of Substance Use: reports: None





- Smoking History


Smoking history: Former smoker (smoked over 1 ppd for over 40 years, quit 6 

years ago)


Have you smoked in the past 12 months: No


Aproximately how many cigarettes per day: 0


If you are a former smoker, when did you quit?: 2012





- Social History


Usual Living Arrangement: Alone


ADL: Independent


History of Recent Travel: No





Home Medications





- Allergies


Allergies/Adverse Reactions: 


 Allergies











Allergy/AdvReac Type Severity Reaction Status Date / Time


 


Penicillins Allergy   Verified 06/10/19 17:01














- Home Medications


Home Medications: 


Ambulatory Orders





Atorvastatin Ca [Lipitor] 40 mg PO HS 06/28/13 


Cholecalciferol (Vitamin D3) [Vitamin D3] 1,000 unit PO DAILY 12/04/15 


Cyanocobalamin [Vitamin B12 -] 1,000 mcg PO DAILY 12/04/15 


Omega-3 Fatty Acids [Fish Oil] 300 mg PO DAILY 12/04/15 


Aspirin [ASA -] 81 mg PO DAILY 11/14/16 


Metoprolol Succinate [Toprol XL -] 12.5 mg PO DAILY 11/14/16 











Family Disease History





- Family Disease History


Family Disease History: Other: Father (htn), Mother (htn)





Physical Exam-Neuro


Vital Signs: 


 Vital Signs











Temperature  99 F   06/11/19 09:00


 


Pulse Rate  114 H  06/11/19 09:00


 


Respiratory Rate  23 H  06/11/19 09:00


 


Blood Pressure  89/53 L  06/11/19 09:00


 


O2 Sat by Pulse Oximetry (%)  91 L  06/11/19 08:25











Labs: 


 CBC, BMP





 06/11/19 05:20 





 06/11/19 05:20 





 INR, PTT











INR  1.49  (0.83-1.09)  H  06/10/19  15:00    


 


Fibrinogen  351.0 mg/dL (238-498)   06/10/19  15:00    














Assessment/Plan


cc Hypoxic Ischemic Encephalopathy


HPI 74 year old female history of COPD, OA, Prediabetic, HTN , HLD. Patient 

presented with worsening of copd. Patient was seen by dr Garcia and was 

suspected to be Restless leg syndrome.


On 6/9/2019 she has cardiac arrest and ACLS protocol started. Patient is not 

waking up and intuabted and minimal triggering vent. There is no response to 

pain or verbal stimuli. Patient is off sedation. She had ct head and it showed 

bilateral cerebral and cerebellar ischemic changes consistent with hypoxic and 

ischemic encephalopathy


PMH HTN,HLD, COPD, Arthritis, Prediabetes. Former smoker 








Home Medications: 








Atorvastatin Ca [Lipitor] 40 mg PO HS 06/28/13 


Cholecalciferol (Vitamin D3) [Vitamin D3] 1,000 unit PO DAILY 12/04/15 


Cyanocobalamin [Vitamin B12 -] 1,000 mcg PO DAILY 12/04/15 


Omega-3 Fatty Acids [Fish Oil] 300 mg PO DAILY 12/04/15 


Aspirin [ASA -] 81 mg PO DAILY 11/14/16 


Metoprolol Succinate [Toprol XL -] 12.5 mg PO DAILY 11/14/16 











NEUROLOGICAL EXAMINATION


Patient on ventilator on pressures and no sedation


There is no response to pain or verbal stimuli


pupils non reactive more so on right and left is slightly reactive 


coreal reflex are absent


caloric test and dolls eye movement is negative


pateint has minimal triggering of vent








ct head reviewed showed bilateral cortical and cerebellar ischemic changes


Range of possibility discussed with family at bed side. Chart reviewed, spoke 

to primary team, I spent 35 minute doing critical care











Assessment/Plan  Diffuse Hypoxic Ischemic Encephalopathy secondary to cardiac 

arrest. Patient is off sedation and on pressures for  and there is no cortical 

sign  adn very minimal brain stem sign , overall prognosis is poor at this 

time. Please feel free to call me if you have any question





Thanking you so much


Jesús Huber MD

## 2019-06-11 NOTE — PN
Progress Note, Physician


Chief Complaint: 


Seen patient earlier today


Acute Hypoxic Respiratory Failure


COPD exacerbation 


AMS


History of Present Illness: 





-Mech vened in ICU 


-SVT's, hypotensive on pressors


Intermittent twitching noted with eye opening


Remains intubated, unresponsive off sedation. CT head done suggestive of 

diffuse anoxic brain injury





- Current Medication List


Current Medications: 


Active Medications





Morphine Sulfate (Morphine 100mg/100ml-0.9% Nacl)  100 mg in 100 mls @ 1 mls/hr 

IVPB TITR DORIE; Protocol


   Last Admin: 06/11/19 16:45 Dose:  1 mg/hr, 1 mls/hr


Lorazepam (Ativan Injection -)  1 mg IM Q6H PRN


   PRN Reason: AGITATION











- Objective


Vital Signs: 


 Vital Signs











Temperature  98.1 F   06/11/19 16:00


 


Pulse Rate  83   06/11/19 16:00


 


Respiratory Rate  20   06/11/19 16:00


 


Blood Pressure  101/53 L  06/11/19 16:00


 


O2 Sat by Pulse Oximetry (%)  95   06/11/19 13:00











Constitutional: Yes: Well Nourished, No Distress, Calm


Cardiovascular: Yes: Tachycardia


Respiratory: Yes: Mechanically Ventilated


Labs: 


 CBC, BMP





 06/11/19 05:20 





 06/11/19 05:20 





 INR, PTT











INR  1.49  (0.83-1.09)  H  06/10/19  15:00    


 


Fibrinogen  351.0 mg/dL (238-498)   06/10/19  15:00    














Problem List





- Problems


(1) Bronchitis


Code(s): J40 - BRONCHITIS, NOT SPECIFIED AS ACUTE OR CHRONIC   





(2) Cardiopulmonary arrest


Code(s): I46.9 - CARDIAC ARREST, CAUSE UNSPECIFIED   





(3) Septic shock


Code(s): A41.9 - SEPSIS, UNSPECIFIED ORGANISM; R65.21 - SEVERE SEPSIS WITH 

SEPTIC SHOCK   





(4) Toxic metabolic encephalopathy


Code(s): G92 - TOXIC ENCEPHALOPATHY

## 2019-06-11 NOTE — PN
Progress Note (short form)





- Note


Progress Note: 





Asked to assess patient for unresponsiveness after compassionate extubation. (

compassionate extubation at 16:50)


Patient unresponsive to cerbal or physical stimuli.


Absent pupillary reflexes to light bilaterally. 


Absent breath sounds along bilateral lung fields, without spontaneous 

respirations. 


Absent cardiac sounds. 


Absent carotid pulses bilaterally.


Absent femoral pulses bilaterally.





Time of death 17:20


Case discussed with [facility].


Emotional support provided to family at bedside.